# Patient Record
Sex: MALE | Race: WHITE | Employment: OTHER | URBAN - METROPOLITAN AREA
[De-identification: names, ages, dates, MRNs, and addresses within clinical notes are randomized per-mention and may not be internally consistent; named-entity substitution may affect disease eponyms.]

---

## 2017-09-21 ENCOUNTER — HOSPITAL ENCOUNTER (OUTPATIENT)
Dept: GENERAL RADIOLOGY | Age: 72
Discharge: HOME | End: 2017-09-21
Attending: FAMILY MEDICINE
Payer: MEDICARE

## 2017-09-21 DIAGNOSIS — M25.561 ACUTE PAIN OF RIGHT KNEE: ICD-10-CM

## 2017-09-21 PROCEDURE — 73562 X-RAY EXAM OF KNEE 3: CPT

## 2018-03-02 ENCOUNTER — HOSPITAL ENCOUNTER (OUTPATIENT)
Dept: GENERAL RADIOLOGY | Age: 73
Discharge: HOME | End: 2018-03-02
Attending: FAMILY MEDICINE
Payer: MEDICARE

## 2018-03-02 DIAGNOSIS — M54.2 NECK PAIN: ICD-10-CM

## 2018-03-02 PROCEDURE — 72052 X-RAY EXAM NECK SPINE 6/>VWS: CPT

## 2018-03-09 ENCOUNTER — HOSPITAL ENCOUNTER (OUTPATIENT)
Dept: MRI IMAGING | Age: 73
Discharge: HOME | End: 2018-03-09
Attending: FAMILY MEDICINE
Payer: MEDICARE

## 2018-03-09 DIAGNOSIS — M50.30 DDD (DEGENERATIVE DISC DISEASE), CERVICAL: ICD-10-CM

## 2018-03-09 DIAGNOSIS — M54.2 NECK PAIN: ICD-10-CM

## 2018-03-09 PROCEDURE — 72141 MRI NECK SPINE W/O DYE: CPT

## 2018-03-12 NOTE — PROGRESS NOTES
Not able to complete study  Able to get partial info- severe cervical spine dz    Ref Dr Lisseth Garland    call

## 2018-07-18 PROBLEM — M17.11 PRIMARY OSTEOARTHRITIS OF RIGHT KNEE: Status: ACTIVE | Noted: 2018-07-18

## 2018-07-18 PROBLEM — I10 DIABETES MELLITUS WITH COINCIDENT HYPERTENSION (HCC): Status: ACTIVE | Noted: 2018-07-18

## 2018-07-18 PROBLEM — E11.9 DIABETES MELLITUS WITH COINCIDENT HYPERTENSION (HCC): Status: ACTIVE | Noted: 2018-07-18

## 2018-07-23 ENCOUNTER — HOSPITAL ENCOUNTER (OUTPATIENT)
Dept: PREADMISSION TESTING | Age: 73
Discharge: HOME | End: 2018-07-23
Payer: MEDICARE

## 2018-07-23 VITALS
SYSTOLIC BLOOD PRESSURE: 145 MMHG | WEIGHT: 222.5 LBS | TEMPERATURE: 98.1 F | DIASTOLIC BLOOD PRESSURE: 86 MMHG | HEIGHT: 73 IN | OXYGEN SATURATION: 96 % | HEART RATE: 56 BPM | BODY MASS INDEX: 29.49 KG/M2

## 2018-07-23 LAB
ABO + RH BLD: NORMAL
APPEARANCE UR: CLEAR
BACTERIA URNS QL MICRO: NEGATIVE /HPF
BILIRUB UR QL: NEGATIVE
BLOOD GROUP ANTIBODIES SERPL: NORMAL
COLOR UR: ABNORMAL
EPITH CASTS URNS QL MICRO: ABNORMAL /LPF
EST. AVERAGE GLUCOSE BLD GHB EST-MCNC: 143 MG/DL
GLUCOSE UR STRIP.AUTO-MCNC: 250 MG/DL
HBA1C MFR BLD: 6.6 % (ref 4.2–6.3)
HGB UR QL STRIP: NEGATIVE
HYALINE CASTS URNS QL MICRO: ABNORMAL /LPF (ref 0–5)
KETONES UR QL STRIP.AUTO: NEGATIVE MG/DL
LEUKOCYTE ESTERASE UR QL STRIP.AUTO: NEGATIVE
NITRITE UR QL STRIP.AUTO: NEGATIVE
PH UR STRIP: 6 [PH] (ref 5–8)
PROT UR STRIP-MCNC: NEGATIVE MG/DL
RBC #/AREA URNS HPF: ABNORMAL /HPF (ref 0–5)
SP GR UR REFRACTOMETRY: 1.02 (ref 1–1.03)
SPECIMEN EXP DATE BLD: NORMAL
UA: UC IF INDICATED,UAUC: ABNORMAL
UROBILINOGEN UR QL STRIP.AUTO: 1 EU/DL (ref 0.2–1)
WBC URNS QL MICRO: ABNORMAL /HPF (ref 0–4)

## 2018-07-23 PROCEDURE — 83036 HEMOGLOBIN GLYCOSYLATED A1C: CPT | Performed by: ORTHOPAEDIC SURGERY

## 2018-07-23 PROCEDURE — 36415 COLL VENOUS BLD VENIPUNCTURE: CPT | Performed by: ORTHOPAEDIC SURGERY

## 2018-07-23 PROCEDURE — 81001 URINALYSIS AUTO W/SCOPE: CPT | Performed by: ORTHOPAEDIC SURGERY

## 2018-07-23 PROCEDURE — 86900 BLOOD TYPING SEROLOGIC ABO: CPT | Performed by: ORTHOPAEDIC SURGERY

## 2018-07-24 LAB
BACTERIA SPEC CULT: NORMAL
BACTERIA SPEC CULT: NORMAL
SERVICE CMNT-IMP: NORMAL

## 2018-07-25 RX ORDER — ACETAMINOPHEN 500 MG
1000 TABLET ORAL ONCE
Status: CANCELLED | OUTPATIENT
Start: 2018-07-25 | End: 2018-07-25

## 2018-07-25 RX ORDER — CELECOXIB 200 MG/1
200 CAPSULE ORAL ONCE
Status: CANCELLED | OUTPATIENT
Start: 2018-07-25 | End: 2018-07-25

## 2018-07-25 RX ORDER — CEFAZOLIN SODIUM/WATER 2 G/20 ML
2 SYRINGE (ML) INTRAVENOUS ONCE
Status: CANCELLED | OUTPATIENT
Start: 2018-07-25 | End: 2018-07-25

## 2018-07-25 RX ORDER — PREGABALIN 75 MG/1
75 CAPSULE ORAL ONCE
Status: CANCELLED | OUTPATIENT
Start: 2018-07-25 | End: 2018-07-25

## 2018-07-30 ENCOUNTER — ANESTHESIA EVENT (OUTPATIENT)
Dept: SURGERY | Age: 73
DRG: 470 | End: 2018-07-30
Payer: MEDICARE

## 2018-07-31 ENCOUNTER — HOSPITAL ENCOUNTER (INPATIENT)
Age: 73
LOS: 1 days | Discharge: HOME HEALTH | DRG: 470 | End: 2018-08-01
Attending: ORTHOPAEDIC SURGERY | Admitting: ORTHOPAEDIC SURGERY
Payer: MEDICARE

## 2018-07-31 ENCOUNTER — ANESTHESIA (OUTPATIENT)
Dept: SURGERY | Age: 73
DRG: 470 | End: 2018-07-31
Payer: MEDICARE

## 2018-07-31 DIAGNOSIS — M17.11 PRIMARY OSTEOARTHRITIS OF RIGHT KNEE: Primary | ICD-10-CM

## 2018-07-31 LAB
GLUCOSE BLD STRIP.AUTO-MCNC: 131 MG/DL (ref 65–100)
GLUCOSE BLD STRIP.AUTO-MCNC: 136 MG/DL (ref 65–100)
GLUCOSE BLD STRIP.AUTO-MCNC: 145 MG/DL (ref 65–100)
GLUCOSE BLD STRIP.AUTO-MCNC: 165 MG/DL (ref 65–100)
SERVICE CMNT-IMP: ABNORMAL

## 2018-07-31 PROCEDURE — 77030002991 HC SUT QUILL SSPC -B: Performed by: ORTHOPAEDIC SURGERY

## 2018-07-31 PROCEDURE — C1776 JOINT DEVICE (IMPLANTABLE): HCPCS | Performed by: ORTHOPAEDIC SURGERY

## 2018-07-31 PROCEDURE — 77030002933 HC SUT MCRYL J&J -A: Performed by: ORTHOPAEDIC SURGERY

## 2018-07-31 PROCEDURE — 74011250636 HC RX REV CODE- 250/636: Performed by: PHYSICIAN ASSISTANT

## 2018-07-31 PROCEDURE — 77030018822 HC SLV COMPR FT COVD -B

## 2018-07-31 PROCEDURE — 74011000250 HC RX REV CODE- 250: Performed by: ORTHOPAEDIC SURGERY

## 2018-07-31 PROCEDURE — 77030000032 HC CUF TRNQT ZIMM -B: Performed by: ORTHOPAEDIC SURGERY

## 2018-07-31 PROCEDURE — 64450 NJX AA&/STRD OTHER PN/BRANCH: CPT

## 2018-07-31 PROCEDURE — 77030006822 HC BLD SAW SAG BRSM -B: Performed by: ORTHOPAEDIC SURGERY

## 2018-07-31 PROCEDURE — 74011250636 HC RX REV CODE- 250/636

## 2018-07-31 PROCEDURE — 77030006812 HC BLD SAW RECIP STRY -B: Performed by: ORTHOPAEDIC SURGERY

## 2018-07-31 PROCEDURE — 74011250637 HC RX REV CODE- 250/637: Performed by: PHYSICIAN ASSISTANT

## 2018-07-31 PROCEDURE — 76010000172 HC OR TIME 2.5 TO 3 HR INTENSV-TIER 1: Performed by: ORTHOPAEDIC SURGERY

## 2018-07-31 PROCEDURE — 74011636637 HC RX REV CODE- 636/637: Performed by: ORTHOPAEDIC SURGERY

## 2018-07-31 PROCEDURE — 77030031139 HC SUT VCRL2 J&J -A: Performed by: ORTHOPAEDIC SURGERY

## 2018-07-31 PROCEDURE — 77030020782 HC GWN BAIR PAWS FLX 3M -B

## 2018-07-31 PROCEDURE — 77030003601 HC NDL NRV BLK BBMI -A

## 2018-07-31 PROCEDURE — 77030011640 HC PAD GRND REM COVD -A: Performed by: ORTHOPAEDIC SURGERY

## 2018-07-31 PROCEDURE — 74011250636 HC RX REV CODE- 250/636: Performed by: ANESTHESIOLOGY

## 2018-07-31 PROCEDURE — 97116 GAIT TRAINING THERAPY: CPT | Performed by: PHYSICAL THERAPIST

## 2018-07-31 PROCEDURE — 82962 GLUCOSE BLOOD TEST: CPT

## 2018-07-31 PROCEDURE — 77030018836 HC SOL IRR NACL ICUM -A: Performed by: ORTHOPAEDIC SURGERY

## 2018-07-31 PROCEDURE — 77030012935 HC DRSG AQUACEL BMS -B: Performed by: ORTHOPAEDIC SURGERY

## 2018-07-31 PROCEDURE — C1713 ANCHOR/SCREW BN/BN,TIS/BN: HCPCS | Performed by: ORTHOPAEDIC SURGERY

## 2018-07-31 PROCEDURE — 77030028224 HC PDNG CST BSNM -A: Performed by: ORTHOPAEDIC SURGERY

## 2018-07-31 PROCEDURE — 76210000006 HC OR PH I REC 0.5 TO 1 HR: Performed by: ORTHOPAEDIC SURGERY

## 2018-07-31 PROCEDURE — 77030007866 HC KT SPN ANES BBMI -B: Performed by: ANESTHESIOLOGY

## 2018-07-31 PROCEDURE — 97161 PT EVAL LOW COMPLEX 20 MIN: CPT | Performed by: PHYSICAL THERAPIST

## 2018-07-31 PROCEDURE — 77030038020 HC MANFLD NEPTUNE STRY -B: Performed by: ORTHOPAEDIC SURGERY

## 2018-07-31 PROCEDURE — 65270000029 HC RM PRIVATE

## 2018-07-31 PROCEDURE — 74011000258 HC RX REV CODE- 258

## 2018-07-31 PROCEDURE — 77030019908 HC STETH ESOPH SIMS -A: Performed by: ANESTHESIOLOGY

## 2018-07-31 PROCEDURE — 76060000036 HC ANESTHESIA 2.5 TO 3 HR: Performed by: ORTHOPAEDIC SURGERY

## 2018-07-31 PROCEDURE — 77030014077 HC TOWER MX CEM J&J -C: Performed by: ORTHOPAEDIC SURGERY

## 2018-07-31 PROCEDURE — 77030039266 HC ADH SKN EXOFIN S2SG -A: Performed by: ORTHOPAEDIC SURGERY

## 2018-07-31 PROCEDURE — 77030034850: Performed by: ORTHOPAEDIC SURGERY

## 2018-07-31 PROCEDURE — 74011000250 HC RX REV CODE- 250

## 2018-07-31 PROCEDURE — 74011250637 HC RX REV CODE- 250/637: Performed by: ORTHOPAEDIC SURGERY

## 2018-07-31 PROCEDURE — 77030020788: Performed by: ORTHOPAEDIC SURGERY

## 2018-07-31 PROCEDURE — 0SRC0L9 REPLACEMENT OF RIGHT KNEE JOINT WITH MEDIAL UNICONDYLAR SYNTHETIC SUBSTITUTE, CEMENTED, OPEN APPROACH: ICD-10-PCS | Performed by: ORTHOPAEDIC SURGERY

## 2018-07-31 PROCEDURE — 74011250636 HC RX REV CODE- 250/636: Performed by: ORTHOPAEDIC SURGERY

## 2018-07-31 PROCEDURE — 3E0T3BZ INTRODUCTION OF ANESTHETIC AGENT INTO PERIPHERAL NERVES AND PLEXI, PERCUTANEOUS APPROACH: ICD-10-PCS | Performed by: ANESTHESIOLOGY

## 2018-07-31 DEVICE — CEMENT BNE GENTAMICIN 40 GM SMARTSET GMV: Type: IMPLANTABLE DEVICE | Site: KNEE | Status: FUNCTIONAL

## 2018-07-31 DEVICE — SIGMA HIGH PERFORMANCE PARTIAL KNEE FEMORAL UNICONDYLAR CEMENTED SIZE 5 LM/RL
Type: IMPLANTABLE DEVICE | Site: KNEE | Status: FUNCTIONAL
Brand: SIGMA

## 2018-07-31 DEVICE — SIGMA HIGH PERFORMANCE PARTIAL KNEE TIBIAL TRAY UNICONDYLAR METAL BACKED SIZE 4 LM/RL
Type: IMPLANTABLE DEVICE | Site: KNEE | Status: FUNCTIONAL
Brand: SIGMA

## 2018-07-31 DEVICE — SIGMA HIGH PERFORMANCE PARTIAL KNEE TIBIAL INSERT FIXED BEARING UNICONDYLAR SIZE 4 LM/RL 7MM XLK
Type: IMPLANTABLE DEVICE | Site: KNEE | Status: FUNCTIONAL
Brand: SIGMA

## 2018-07-31 DEVICE — COMPONENT TOT KNEE UPLR FEM PART: Type: IMPLANTABLE DEVICE | Site: KNEE | Status: FUNCTIONAL

## 2018-07-31 RX ORDER — FENTANYL CITRATE 50 UG/ML
25 INJECTION, SOLUTION INTRAMUSCULAR; INTRAVENOUS
Status: DISCONTINUED | OUTPATIENT
Start: 2018-07-31 | End: 2018-07-31 | Stop reason: HOSPADM

## 2018-07-31 RX ORDER — POLYETHYLENE GLYCOL 3350 17 G/17G
17 POWDER, FOR SOLUTION ORAL DAILY
Status: DISCONTINUED | OUTPATIENT
Start: 2018-08-01 | End: 2018-08-01 | Stop reason: HOSPADM

## 2018-07-31 RX ORDER — HYDROXYZINE HYDROCHLORIDE 10 MG/1
10 TABLET, FILM COATED ORAL
Status: DISCONTINUED | OUTPATIENT
Start: 2018-07-31 | End: 2018-08-01 | Stop reason: HOSPADM

## 2018-07-31 RX ORDER — KETOROLAC TROMETHAMINE 30 MG/ML
15 INJECTION, SOLUTION INTRAMUSCULAR; INTRAVENOUS EVERY 6 HOURS
Status: COMPLETED | OUTPATIENT
Start: 2018-07-31 | End: 2018-08-01

## 2018-07-31 RX ORDER — FACIAL-BODY WIPES
10 EACH TOPICAL DAILY PRN
Status: DISCONTINUED | OUTPATIENT
Start: 2018-08-01 | End: 2018-08-01 | Stop reason: HOSPADM

## 2018-07-31 RX ORDER — CEFAZOLIN SODIUM/WATER 2 G/20 ML
2 SYRINGE (ML) INTRAVENOUS EVERY 8 HOURS
Status: COMPLETED | OUTPATIENT
Start: 2018-07-31 | End: 2018-07-31

## 2018-07-31 RX ORDER — MAGNESIUM SULFATE 100 %
4 CRYSTALS MISCELLANEOUS AS NEEDED
Status: DISCONTINUED | OUTPATIENT
Start: 2018-07-31 | End: 2018-08-01 | Stop reason: HOSPADM

## 2018-07-31 RX ORDER — SODIUM CHLORIDE, SODIUM LACTATE, POTASSIUM CHLORIDE, CALCIUM CHLORIDE 600; 310; 30; 20 MG/100ML; MG/100ML; MG/100ML; MG/100ML
75 INJECTION, SOLUTION INTRAVENOUS CONTINUOUS
Status: DISCONTINUED | OUTPATIENT
Start: 2018-07-31 | End: 2018-07-31 | Stop reason: HOSPADM

## 2018-07-31 RX ORDER — HYDROMORPHONE HYDROCHLORIDE 1 MG/ML
0.5 INJECTION, SOLUTION INTRAMUSCULAR; INTRAVENOUS; SUBCUTANEOUS
Status: DISCONTINUED | OUTPATIENT
Start: 2018-07-31 | End: 2018-08-01 | Stop reason: HOSPADM

## 2018-07-31 RX ORDER — ASPIRIN 81 MG/1
81 TABLET ORAL 2 TIMES DAILY
Status: DISCONTINUED | OUTPATIENT
Start: 2018-07-31 | End: 2018-08-01 | Stop reason: HOSPADM

## 2018-07-31 RX ORDER — SODIUM CHLORIDE 0.9 % (FLUSH) 0.9 %
5-10 SYRINGE (ML) INJECTION EVERY 8 HOURS
Status: DISCONTINUED | OUTPATIENT
Start: 2018-07-31 | End: 2018-07-31 | Stop reason: HOSPADM

## 2018-07-31 RX ORDER — FENTANYL CITRATE 50 UG/ML
50 INJECTION, SOLUTION INTRAMUSCULAR; INTRAVENOUS AS NEEDED
Status: DISCONTINUED | OUTPATIENT
Start: 2018-07-31 | End: 2018-07-31 | Stop reason: HOSPADM

## 2018-07-31 RX ORDER — FINASTERIDE 5 MG/1
5 TABLET, FILM COATED ORAL DAILY
Status: DISCONTINUED | OUTPATIENT
Start: 2018-08-01 | End: 2018-08-01 | Stop reason: HOSPADM

## 2018-07-31 RX ORDER — MIDAZOLAM HYDROCHLORIDE 1 MG/ML
1 INJECTION, SOLUTION INTRAMUSCULAR; INTRAVENOUS AS NEEDED
Status: DISCONTINUED | OUTPATIENT
Start: 2018-07-31 | End: 2018-07-31 | Stop reason: HOSPADM

## 2018-07-31 RX ORDER — AMOXICILLIN 250 MG
1 CAPSULE ORAL 2 TIMES DAILY
Status: DISCONTINUED | OUTPATIENT
Start: 2018-07-31 | End: 2018-08-01 | Stop reason: HOSPADM

## 2018-07-31 RX ORDER — ONDANSETRON 2 MG/ML
4 INJECTION INTRAMUSCULAR; INTRAVENOUS AS NEEDED
Status: DISCONTINUED | OUTPATIENT
Start: 2018-07-31 | End: 2018-07-31 | Stop reason: HOSPADM

## 2018-07-31 RX ORDER — ROPIVACAINE HYDROCHLORIDE 5 MG/ML
30 INJECTION, SOLUTION EPIDURAL; INFILTRATION; PERINEURAL ONCE
Status: DISCONTINUED | OUTPATIENT
Start: 2018-07-31 | End: 2018-07-31 | Stop reason: HOSPADM

## 2018-07-31 RX ORDER — MORPHINE SULFATE 10 MG/ML
2 INJECTION, SOLUTION INTRAMUSCULAR; INTRAVENOUS
Status: DISCONTINUED | OUTPATIENT
Start: 2018-07-31 | End: 2018-07-31 | Stop reason: HOSPADM

## 2018-07-31 RX ORDER — ACETAMINOPHEN 500 MG
1000 TABLET ORAL ONCE
Status: COMPLETED | OUTPATIENT
Start: 2018-07-31 | End: 2018-07-31

## 2018-07-31 RX ORDER — ACETAMINOPHEN 500 MG
500 TABLET ORAL
Status: DISCONTINUED | OUTPATIENT
Start: 2018-07-31 | End: 2018-08-01 | Stop reason: HOSPADM

## 2018-07-31 RX ORDER — SODIUM CHLORIDE 9 MG/ML
25 INJECTION, SOLUTION INTRAVENOUS CONTINUOUS
Status: DISCONTINUED | OUTPATIENT
Start: 2018-07-31 | End: 2018-07-31 | Stop reason: HOSPADM

## 2018-07-31 RX ORDER — OXYCODONE HYDROCHLORIDE 5 MG/1
10 TABLET ORAL
Status: DISCONTINUED | OUTPATIENT
Start: 2018-07-31 | End: 2018-08-01 | Stop reason: HOSPADM

## 2018-07-31 RX ORDER — METFORMIN HYDROCHLORIDE 500 MG/1
1000 TABLET ORAL 2 TIMES DAILY WITH MEALS
Status: DISCONTINUED | OUTPATIENT
Start: 2018-08-01 | End: 2018-08-01 | Stop reason: HOSPADM

## 2018-07-31 RX ORDER — PROPOFOL 10 MG/ML
INJECTION, EMULSION INTRAVENOUS AS NEEDED
Status: DISCONTINUED | OUTPATIENT
Start: 2018-07-31 | End: 2018-07-31 | Stop reason: HOSPADM

## 2018-07-31 RX ORDER — PROPOFOL 10 MG/ML
INJECTION, EMULSION INTRAVENOUS
Status: DISCONTINUED | OUTPATIENT
Start: 2018-07-31 | End: 2018-07-31 | Stop reason: HOSPADM

## 2018-07-31 RX ORDER — SODIUM CHLORIDE 9 MG/ML
125 INJECTION, SOLUTION INTRAVENOUS CONTINUOUS
Status: DISPENSED | OUTPATIENT
Start: 2018-07-31 | End: 2018-08-01

## 2018-07-31 RX ORDER — SODIUM CHLORIDE 0.9 % (FLUSH) 0.9 %
5-10 SYRINGE (ML) INJECTION AS NEEDED
Status: DISCONTINUED | OUTPATIENT
Start: 2018-07-31 | End: 2018-08-01 | Stop reason: HOSPADM

## 2018-07-31 RX ORDER — BUPIVACAINE HYDROCHLORIDE 5 MG/ML
INJECTION, SOLUTION EPIDURAL; INTRACAUDAL AS NEEDED
Status: DISCONTINUED | OUTPATIENT
Start: 2018-07-31 | End: 2018-07-31 | Stop reason: HOSPADM

## 2018-07-31 RX ORDER — SODIUM CHLORIDE 0.9 % (FLUSH) 0.9 %
5-10 SYRINGE (ML) INJECTION AS NEEDED
Status: DISCONTINUED | OUTPATIENT
Start: 2018-07-31 | End: 2018-07-31 | Stop reason: HOSPADM

## 2018-07-31 RX ORDER — PREGABALIN 75 MG/1
75 CAPSULE ORAL ONCE
Status: COMPLETED | OUTPATIENT
Start: 2018-07-31 | End: 2018-07-31

## 2018-07-31 RX ORDER — NALOXONE HYDROCHLORIDE 0.4 MG/ML
0.4 INJECTION, SOLUTION INTRAMUSCULAR; INTRAVENOUS; SUBCUTANEOUS AS NEEDED
Status: DISCONTINUED | OUTPATIENT
Start: 2018-07-31 | End: 2018-08-01 | Stop reason: HOSPADM

## 2018-07-31 RX ORDER — CELECOXIB 200 MG/1
200 CAPSULE ORAL ONCE
Status: COMPLETED | OUTPATIENT
Start: 2018-07-31 | End: 2018-07-31

## 2018-07-31 RX ORDER — ONDANSETRON 2 MG/ML
4 INJECTION INTRAMUSCULAR; INTRAVENOUS
Status: ACTIVE | OUTPATIENT
Start: 2018-07-31 | End: 2018-08-01

## 2018-07-31 RX ORDER — SODIUM CHLORIDE, SODIUM LACTATE, POTASSIUM CHLORIDE, CALCIUM CHLORIDE 600; 310; 30; 20 MG/100ML; MG/100ML; MG/100ML; MG/100ML
125 INJECTION, SOLUTION INTRAVENOUS CONTINUOUS
Status: DISCONTINUED | OUTPATIENT
Start: 2018-07-31 | End: 2018-07-31 | Stop reason: HOSPADM

## 2018-07-31 RX ORDER — CEFAZOLIN SODIUM/WATER 2 G/20 ML
2 SYRINGE (ML) INTRAVENOUS ONCE
Status: COMPLETED | OUTPATIENT
Start: 2018-07-31 | End: 2018-07-31

## 2018-07-31 RX ORDER — LIDOCAINE HYDROCHLORIDE 10 MG/ML
0.1 INJECTION, SOLUTION EPIDURAL; INFILTRATION; INTRACAUDAL; PERINEURAL AS NEEDED
Status: DISCONTINUED | OUTPATIENT
Start: 2018-07-31 | End: 2018-07-31 | Stop reason: HOSPADM

## 2018-07-31 RX ORDER — OXYCODONE HYDROCHLORIDE 5 MG/1
5 TABLET ORAL
Status: DISCONTINUED | OUTPATIENT
Start: 2018-07-31 | End: 2018-08-01 | Stop reason: HOSPADM

## 2018-07-31 RX ORDER — SODIUM CHLORIDE 0.9 % (FLUSH) 0.9 %
5-10 SYRINGE (ML) INJECTION EVERY 8 HOURS
Status: DISCONTINUED | OUTPATIENT
Start: 2018-07-31 | End: 2018-08-01 | Stop reason: HOSPADM

## 2018-07-31 RX ORDER — DIPHENHYDRAMINE HYDROCHLORIDE 50 MG/ML
12.5 INJECTION, SOLUTION INTRAMUSCULAR; INTRAVENOUS AS NEEDED
Status: DISCONTINUED | OUTPATIENT
Start: 2018-07-31 | End: 2018-07-31 | Stop reason: HOSPADM

## 2018-07-31 RX ORDER — GLIMEPIRIDE 2 MG/1
2 TABLET ORAL
Status: DISCONTINUED | OUTPATIENT
Start: 2018-08-01 | End: 2018-08-01 | Stop reason: HOSPADM

## 2018-07-31 RX ORDER — INSULIN LISPRO 100 [IU]/ML
INJECTION, SOLUTION INTRAVENOUS; SUBCUTANEOUS
Status: DISCONTINUED | OUTPATIENT
Start: 2018-07-31 | End: 2018-08-01 | Stop reason: HOSPADM

## 2018-07-31 RX ORDER — MIDAZOLAM HYDROCHLORIDE 1 MG/ML
0.5 INJECTION, SOLUTION INTRAMUSCULAR; INTRAVENOUS
Status: DISCONTINUED | OUTPATIENT
Start: 2018-07-31 | End: 2018-07-31 | Stop reason: HOSPADM

## 2018-07-31 RX ORDER — DEXTROSE 50 % IN WATER (D50W) INTRAVENOUS SYRINGE
12.5-25 AS NEEDED
Status: DISCONTINUED | OUTPATIENT
Start: 2018-07-31 | End: 2018-08-01 | Stop reason: HOSPADM

## 2018-07-31 RX ADMIN — PROPOFOL 20 MG: 10 INJECTION, EMULSION INTRAVENOUS at 07:21

## 2018-07-31 RX ADMIN — KETOROLAC TROMETHAMINE 15 MG: 30 INJECTION, SOLUTION INTRAMUSCULAR; INTRAVENOUS at 16:33

## 2018-07-31 RX ADMIN — INSULIN LISPRO 2 UNITS: 100 INJECTION, SOLUTION INTRAVENOUS; SUBCUTANEOUS at 16:32

## 2018-07-31 RX ADMIN — Medication 10 ML: at 14:00

## 2018-07-31 RX ADMIN — PREGABALIN 75 MG: 75 CAPSULE ORAL at 06:42

## 2018-07-31 RX ADMIN — Medication 2 G: at 07:35

## 2018-07-31 RX ADMIN — MIDAZOLAM HYDROCHLORIDE 2 MG: 1 INJECTION, SOLUTION INTRAMUSCULAR; INTRAVENOUS at 06:57

## 2018-07-31 RX ADMIN — PROPOFOL 20 MG: 10 INJECTION, EMULSION INTRAVENOUS at 07:22

## 2018-07-31 RX ADMIN — BUPIVACAINE HYDROCHLORIDE 2.4 ML: 5 INJECTION, SOLUTION EPIDURAL; INTRACAUDAL at 07:31

## 2018-07-31 RX ADMIN — STANDARDIZED SENNA CONCENTRATE AND DOCUSATE SODIUM 1 TABLET: 8.6; 5 TABLET, FILM COATED ORAL at 17:57

## 2018-07-31 RX ADMIN — PROPOFOL 20 MG: 10 INJECTION, EMULSION INTRAVENOUS at 08:53

## 2018-07-31 RX ADMIN — SODIUM CHLORIDE, SODIUM LACTATE, POTASSIUM CHLORIDE, AND CALCIUM CHLORIDE: 600; 310; 30; 20 INJECTION, SOLUTION INTRAVENOUS at 09:33

## 2018-07-31 RX ADMIN — SODIUM CHLORIDE, SODIUM LACTATE, POTASSIUM CHLORIDE, AND CALCIUM CHLORIDE 125 ML/HR: 600; 310; 30; 20 INJECTION, SOLUTION INTRAVENOUS at 06:37

## 2018-07-31 RX ADMIN — ACETAMINOPHEN 500 MG: 500 TABLET, FILM COATED ORAL at 22:22

## 2018-07-31 RX ADMIN — Medication 10 ML: at 22:00

## 2018-07-31 RX ADMIN — SODIUM CHLORIDE 125 ML/HR: 900 INJECTION, SOLUTION INTRAVENOUS at 13:00

## 2018-07-31 RX ADMIN — SODIUM CHLORIDE 125 ML/HR: 900 INJECTION, SOLUTION INTRAVENOUS at 20:47

## 2018-07-31 RX ADMIN — PROPOFOL 10 MG: 10 INJECTION, EMULSION INTRAVENOUS at 07:20

## 2018-07-31 RX ADMIN — ACETAMINOPHEN 500 MG: 500 TABLET, FILM COATED ORAL at 17:56

## 2018-07-31 RX ADMIN — KETOROLAC TROMETHAMINE 15 MG: 30 INJECTION, SOLUTION INTRAMUSCULAR; INTRAVENOUS at 22:22

## 2018-07-31 RX ADMIN — ACETAMINOPHEN 500 MG: 500 TABLET, FILM COATED ORAL at 14:49

## 2018-07-31 RX ADMIN — PROPOFOL 40 MCG/KG/MIN: 10 INJECTION, EMULSION INTRAVENOUS at 07:20

## 2018-07-31 RX ADMIN — Medication 2 G: at 15:31

## 2018-07-31 RX ADMIN — ASPIRIN 81 MG: 81 TABLET, DELAYED RELEASE ORAL at 17:57

## 2018-07-31 RX ADMIN — CELECOXIB 200 MG: 200 CAPSULE ORAL at 06:42

## 2018-07-31 RX ADMIN — Medication 2 G: at 23:23

## 2018-07-31 RX ADMIN — ACETAMINOPHEN 1000 MG: 500 TABLET, FILM COATED ORAL at 06:41

## 2018-07-31 RX ADMIN — FENTANYL CITRATE 50 MCG: 50 INJECTION, SOLUTION INTRAMUSCULAR; INTRAVENOUS at 06:57

## 2018-07-31 NOTE — PROGRESS NOTES
Spoke with Jessica Day in Dr. Lord Clifton office. Telephone with readback per Dr. Edwar Goode, placed order for insulin sliding scale, ac&hs.

## 2018-07-31 NOTE — PROGRESS NOTES
TRANSFER - IN REPORT:    Verbal report received from [facility] ANN WOODY RN(name) on Yuki Natarajan  being received from [employer]) for routine post - op      Report consisted of patients Situation, Background, Assessment and   Recommendations(SBAR). Information from the following report(s) SBAR, Kardex, OR Summary, Procedure Summary, Intake/Output, MAR and Recent Results was reviewed with the receiving nurse. Opportunity for questions and clarification was provided. Assessment completed upon patients arrival to unit and care assumed.

## 2018-07-31 NOTE — IP AVS SNAPSHOT
[address] [facility] [address] [place] [address] 
[phone] Patient: Lisette Noguera MRN: [id number] About your hospitalization You were admitted on:  July 31, 2018 [facility] on:  August 1, 2018 Why you were hospitalized Your primary diagnosis was:  Not on File Your diagnoses also included:  Primary Osteoarthritis Of One Knee, Right Follow-up Information Follow up With Details Comments Contact Info Vijaya Maravilla MD   [address] [address] 
[phone] AT [facility] skilled nursing and physical therapy. [address] [place] [address] 
[phone] Discharge Orders None A check zari indicates which time of day the medication should be taken. My Medications START taking these medications Instructions Each Dose to Equal  
 Morning Noon Evening Bedtime  
 acetaminophen 500 mg tablet Commonly known as:  TYLENOL Your last dose was: Your next dose is: Take 1 Tab by mouth every four (4) hours (while awake). 500 mg  
    
   
   
   
  
 aspirin delayed-release 81 mg tablet Your last dose was: Your next dose is: Take 1 Tab by mouth two (2) times a day. Indications: for blood clot prevention 81 mg  
    
   
   
   
  
 oxyCODONE IR 5 mg immediate release tablet Commonly known as:  Diamond Daniel Your last dose was: Your next dose is:    
   
   
 1-2 tablets by mouth every 4-6 hours as needed for pain  
     
   
   
   
  
 senna-docusate 8.6-50 mg per tablet Commonly known as:  Ulyslei Caras Your last dose was: Your next dose is: Take 1 Tab by mouth two (2) times a day. 1 Tab CONTINUE taking these medications  Instructions Each Dose to Equal  
 Morning Noon Evening Bedtime  
 finasteride 5 mg tablet Commonly known as:  PROSCAR Your last dose was: Your next dose is: Take 5 mg by mouth daily. 5 mg  
    
   
   
   
  
 glimepiride 2 mg tablet Commonly known as:  AMARYL Your last dose was: Your next dose is: Take 1 Tab by mouth every morning. 2 mg  
    
   
   
   
  
 lisinopril-hydroCHLOROthiazide 20-25 mg per tablet Commonly known as:  Vicente Menjivar Your last dose was: Your next dose is: TAKE 1 TABLET BY MOUTH EVERY DAY  
     
   
   
   
  
 metFORMIN 1,000 mg tablet Commonly known as:  GLUCOPHAGE Your last dose was: Your next dose is: TAKE 1 TAB BY MOUTH TWO (2) TIMES DAILY (WITH MEALS). Where to Get Your Medications Information on where to get these meds will be given to you by the nurse or doctor. ! Ask your nurse or doctor about these medications  
  acetaminophen 500 mg tablet  
 aspirin delayed-release 81 mg tablet  
 oxyCODONE IR 5 mg immediate release tablet  
 senna-docusate 8.6-50 mg per tablet Opioid Education Prescription Opioids: What You Need to Know: 
 
Prescription opioids can be used to help relieve moderate-to-severe pain and are often prescribed following a surgery or injury, or for certain health conditions. These medications can be an important part of treatment but also come with serious risks. Opioids are strong pain medicines. Examples include hydrocodone, oxycodone, fentanyl, and morphine. Heroin is an example of an illegal opioid. It is important to work with your health care provider to make sure you are getting the safest, most effective care. WHAT ARE THE RISKS AND SIDE EFFECTS OF OPIOID USE? Prescription opioids carry serious risks of addiction and overdose, especially with prolonged use.   An opioid overdose, often marked by slow breathing, can cause sudden death. The use of prescription opioids can have a number of side effects as well, even when taken as directed. · Tolerance-meaning you might need to take more of a medication for the same pain relief · Physical dependence-meaning you have symptoms of withdrawal when the medication is stopped. Withdrawal symptoms can include nausea, sweating, chills, diarrhea, stomach cramps, and muscle aches. Withdrawal can last up to several weeks, depending on which drug you took and how long you took it. · Increased sensitivity to pain · Constipation · Nausea, vomiting, and dry mouth · Sleepiness and dizziness · Confusion · Depression · Low levels of testosterone that can result in lower sex drive, energy, and strength · Itching and sweating RISKS ARE GREATER WITH:      
· History of drug misuse, substance use disorder, or overdose · Mental health conditions (such as depression or anxiety) · Sleep apnea · Older age (72 years or older) · Pregnancy Avoid alcohol while taking prescription opioids. Also, unless specifically advised by your health care provider, medications to avoid include: · Benzodiazepines (such as Xanax or Valium) · Muscle relaxants (such as Soma or Flexeril) · Hypnotics (such as Ambien or Lunesta) · Other prescription opioids KNOW YOUR OPTIONS Talk to your health care provider about ways to manage your pain that don't involve prescription opioids. Some of these options may actually work better and have fewer risks and side effects. Options may include: 
· Pain relievers such as acetaminophen, ibuprofen, and naproxen · Some medications that are also used for depression or seizures · Physical therapy and exercise · Counseling to help patients learn how to cope better with triggers of pain and stress. · Application of heat or cold compress · Massage therapy · Relaxation techniques Be Informed Make sure you know the name of your medication, how much and how often to take it, and its potential risks & side effects. IF YOU ARE PRESCRIBED OPIOIDS FOR PAIN: 
· Never take opioids in greater amounts or more often than prescribed. Remember the goal is not to be pain-free but to manage your pain at a tolerable level. · Follow up with your primary care provider to: · Work together to create a plan on how to manage your pain. · Talk about ways to help manage your pain that don't involve prescription opioids. · Talk about any and all concerns and side effects. · Help prevent misuse and abuse. · Never sell or share prescription opioids · Help prevent misuse and abuse. · Store prescription opioids in a secure place and out of reach of others (this may include visitors, children, friends, and family). · Safely dispose of unused/unwanted prescription opioids: Find your community drug take-back program or your pharmacy mail-back program, or flush them down the toilet, following guidance from the Food and Drug Administration (www.fda.gov/Drugs/ResourcesForYou). · Visit www.cdc.gov/drugoverdose to learn about the risks of opioid abuse and overdose. · If you believe you may be struggling with addiction, tell your health care provider and ask for guidance or call [address] [place] [address] at 2-749-596-NKCU. Discharge Instructions Patient meets criteria for BUNDLED PAYMENT  
for Care Improvement Initiative Criteria Contact Information for Orthopedic Nurse Navigator:     
PAMELA Ramirez, RN-BC 
P:[phone] L:[phone] After Hospital Care Plan:  Discharge Instructions Uni Knee Replacement- Dr. Candice Porter Patient Name: Jennifer Rios Date of procedure: 2018 Procedure: Procedure(s): RIGHT LATERAL UNICONDYLAR VS RIGHT TOTAL KNEE ARTHROPLASTY ( SPINAL IV SED ) Surgeon: Surgeon(s) and Role: Zeyad Echols MD - Primary PCP: Brianna Lewis MD 
Date of discharge: No discharge date for patient encounter. Follow up appointments ? Follow up with Dr. Hillary Abel in 4 weeks. Call [phone] to make an appointment. ? If home health has been arranged for you the agency will contact you to arrange dates/times for visits. Please call them if you do not hear from them within 24 hours after you are discharged When to call your Orthopaedic Surgeon: Call [phone]. If you call after 5pm or on a weekend, the on call physician will be contacted ? Unrelieved pain ? Signs of infection-if your incision is red; continues to have drainage; drainage has a foul odor or if you have a persistent fever over 101 degrees ? Signs of a blood clot in your leg-calf pain, tenderness, redness, swelling of lower leg When to call your Primary Care Physician: 
? Concerns about medical conditions such as diabetes, high blood pressure, asthma, congestive heart failure ? Call if blood sugars are elevated, persistent headache or dizziness, coughing or congestion, constipation or diarrhea, burning with urination, abnormal heart rate When to call [id number] go to the nearest emergency room ? Acute onset of chest pain, shortness of breath, difficulty breathing Activity ? Weight bearing as tolerated with walker or crutches. Refer to pages 23-31 of your handbook for instructions and pictures ? Complete your Home Exercise Program daily as instructed by your therapist.  Refer to pages 33-41 of your handbook for instructions and pictures ? Get up every one hour and walk (except at night when sleeping) ? Do not drive or operate heavy machinery Incision Care ? The Aquacel (brown, waterproof) surgical dressing is to remain on your knee for 7 days.  On the 7th day have someone gently peel the dressing off by carefully lifting the edge and stretching it slightly to break the adhesive seal 
 ? If you have steri-strips (small, white pieces of tape) on your incision, they may come off when you remove the Aquacel surgical dressing. This is okay. You may now leave your incision open to air ? If your Aquacel dressing comes loose/off before the 7th day, you may replace it with a dry sterile gauze dressing; change it daily. Once you incision is not draining, you may leave it open to air ? You may take a shower with the Aquacel dressing in place. Once the Aquacel is removed, you may shower and get your incision wet but do not submerge your incision under water in a bath tub, hot tub or swimming pool for 6 weeks after surgery. Preventing blood clots ? Take Enteric coated Aspirin (delayed release) 81mg, one tablet twice a day for one month following surgery Pain management ? Take pain medication as prescribed; decrease the amount you use as your pain lessens ? Avoid alcoholic beverages while taking pain medication 
o Please be aware that many medications contain Tylenol. We do not want you to over medicate so please read the information below as a guide. Do not take more than 3 grams of Tylenol per day. ? You may place an ice bag on your knee for 15-20 minutes after exercising Diet ? Resume usual diet; drink plenty of fluids; eat foods high in fiber ? You may want to take a stool softener (such as Senokot-S or Colace) to prevent constipation while you are taking pain medication. If constipation occurs, take a laxative (such as Dulcolax tablets, Milk of Magnesia, or a suppository) Home Health Care Protocol (to be followed by [facility]) Nursing-per physicians order ? Complete head to toe assessment, vital signs ? Medication reconciliation ? Review pain management ? Manage chronic medical conditions Physical Therapy-per physicians order Weight bearing status: 
Precautions at Admission: Fall, WBAT Mobility Status: 
Supine to Sit: Supervision Sit to Stand: Contact guard assistance Sit to Supine: Supervision Gait: 
Distance (ft): 75 Feet (ft) Ambulation - Level of Assistance: Contact guard assistance Assistive Device: Gait belt, Walker, rolling Gait Abnormalities: Antalgic, Decreased step clearance ADL status overall composite: 
  
  
  
  
 
Physical Therapy ? Assessment and evaluation-bed mobility; functional transfers (bed, chair, bathroom, stairs); ambulation with equipment, car transfers, shower transfers, safety and ability to get out of house in the event of an emergency ? Review weight bearing as tolerated, wean from walker or crutches as tolerated ? Discuss pain management ? Review how to do ADLs. Refer to page 42 of patient handbook Home Exercise Program-refer to pages 33-41 of patient handbook for exercises. [facility] Announcement We are excited to announce that we are making your provider's discharge notes available to you in [facility]. You will see these notes when they are completed and signed by the physician that discharged you from your recent hospital stay. If you have any questions or concerns about any information you see in [facility], please call the Health Information Department where you were seen or reach out to your Primary Care Provider for more information about your plan of care. Introducing [facility]! Jung Noguera introduces [facility] patient portal. Now you can access parts of your medical record, email your doctor's office, and request medication refills online. 1. In your internet browser, go to [email or web address] 2. Click on the First Time User? Click Here link in the Sign In box. You will see the New Member Sign Up page. 3. Enter your [facility] Access Code exactly as it appears below. You will not need to use this code after youve completed the sign-up process. If you do not sign up before the expiration date, you must request a new code. · [employer] Access Code: [email or web address] Expires: 10/16/2018  1:30 PM 
 
4. Enter the last four digits of your Social Security Number (xxxx) and Date of Birth (mm/dd/yyyy) as indicated and click Submit. You will be taken to the next sign-up page. 5. Create a [employer] ID. This will be your [employer] login ID and cannot be changed, so think of one that is secure and easy to remember. 6. Create a [employer] password. You can change your password at any time. 7. Enter your Password Reset Question and Answer. This can be used at a later time if you forget your password. 8. Enter your e-mail address. You will receive e-mail notification when new information is available in [address] 9. Click Sign Up. You can now view and download portions of your medical record. 10. Click the Download Summary menu link to download a portable copy of your medical information. If you have questions, please visit the Frequently Asked Questions section of the [employer] website. Remember, [employer] is NOT to be used for urgent needs. For medical emergencies, dial 911. Now available from your iPhone and Android! Introducing Garth Amaya As a [facility] patient, I wanted to make you aware of our electronic visit tool called Garth Joseflashondabree. [facility] 24/7 allows you to connect within minutes with a medical provider 24 hours a day, seven days a week via a mobile device or tablet or logging into a secure website from your computer. You can access Garth Amaya from anywhere in the [place]. A virtual visit might be right for you when you have a simple condition and feel like you just dont want to get out of bed, or cant get away from work for an appointment, when your regular [facility] provider is not available (evenings, weekends or holidays), or when youre out of town and need minor care.   Electronic visits cost only $49 and if the [facility] 24/7 provider determines a prescription is needed to treat your condition, one can be electronically transmitted to a nearby pharmacy*. Please take a moment to enroll today if you have not already done so. The enrollment process is free and takes just a few minutes. To enroll, please download the [employer] 24/7 lanre to your tablet or phone, or visit [email or web address] to enroll on your computer. And, as an [address] [place] [address] patient with a Managed by Q account, the results of your visits will be scanned into your electronic medical record and your primary care provider will be able to view the scanned results. We urge you to continue to see your regular [employer] provider for your ongoing medical care. And while your primary care provider may not be the one available when you seek a [employer] virtual visit, the peace of mind you get from getting a real diagnosis real time can be priceless. For more information on [employer], view our Frequently Asked Questions (FAQs) at [email or web address]. Sincerely, 
 
Goran Alexander MD 
Chief Medical Officer [employer] *:  certain medications cannot be prescribed via [employer] Providers Seen During Your Hospitalization Provider Specialty Primary office phone Rosario Graves MD Orthopedic Surgery [phone] Your Primary Care Physician (PCP) Primary Care Physician Office Phone Office Fax Lise Solitario [phone] You are allergic to the following No active allergies Recent Documentation Height Weight BMI Smoking Status 1.854 m 100.7 kg 29.29 kg/m2 Never Smoker Emergency Contacts Name Discharge Info Relation Home Work Mobile Frida Melgoza DISCHARGE CAREGIVER [3] Spouse [3] [phone] Patient Belongings The following personal items are in your possession at time of discharge: Visual Aid: Glasses, At bedside, With patient             Clothing:  (bag of clothes returned to patient in pacu)    Other Valuables: Tiesha Drain (returned to patient in pacu) Please provide this summary of care documentation to your next provider. Signatures-by signing, you are acknowledging that this After Visit Summary has been reviewed with you and you have received a copy. Patient Signature:  ____________________________________________________________ Date:  ____________________________________________________________  
  
[facility] Signature:  ____________________________________________________________ Date:  ____________________________________________________________

## 2018-07-31 NOTE — ANESTHESIA PREPROCEDURE EVALUATION
Anesthetic History No history of anesthetic complications Review of Systems / Medical History Patient summary reviewed, nursing notes reviewed and pertinent labs reviewed Pulmonary Within defined limits Neuro/Psych Within defined limits Cardiovascular Hypertension: well controlled GI/Hepatic/Renal 
Within defined limits Endo/Other Diabetes: well controlled, type 2 Arthritis Other Findings Physical Exam 
 
Airway Mallampati: II 
TM Distance: > 6 cm Neck ROM: normal range of motion Mouth opening: Normal 
 
 Cardiovascular Regular rate and rhythm,  S1 and S2 normal,  no murmur, click, rub, or gallop Dental 
No notable dental hx Pulmonary Breath sounds clear to auscultation Abdominal 
GI exam deferred Other Findings Anesthetic Plan ASA: 2 Anesthesia type: spinal 
 
 
 
 
Induction: Intravenous Anesthetic plan and risks discussed with: Patient

## 2018-07-31 NOTE — OP NOTES
[address]   OPERATIVE REPORT    Carlos Abarca  MR#: [id number]  : 1945  ACCOUNT #: [de-identified]   DATE OF SERVICE: 2018    SURGEON:  Gayle Jimenez MD     ASSISTANT:  ESTEFANY Dominguez    PREOPERATIVE DIAGNOSIS:  Lateral compartment osteoarthritis of the right knee. POSTOPERATIVE DIAGNOSIS:  Lateral compartment osteoarthritis of the right knee. PROCEDURE PERFORMED:  Right lateral unicompartmental knee replacement. IMPLANTS:  DePuy Sigma HP size 5 femur, size 4 tibial tray with 7 mm polyethylene insert. ANESTHESIA:  Spinal with sedation as well as adductor canal block. COMPLICATIONS:  None. ESTIMATED BLOOD LOSS:  50 mL. SPECIMENS REMOVED:  None. INDICATIONS:  The patient is a 67-year-old gentleman with progressive severe right lateral knee pain due to lateral compartment osteoarthritis. Symptoms have progressed despite comprehensive conservative treatment, and he presents for a lateral unicompartmental replacement versus total knee replacement. Risks, benefits and alternatives of procedure were reviewed with him in detail and he desires to proceed. PROCEDURE IN DETAIL:  The anesthesia team placed an adductor canal block before taking the patient to the operating room where they also placed a spinal.  Preoperative IV antibiotics were administered. Browning catheter was inserted and a padded pneumatic tourniquet was placed around the right upper thigh. Right lower extremity was prepped and draped in the usual sterile fashion. Through a midline anterior knee incision, I performed a short lateral parapatellar arthrotomy. Joint was inspected. Patellofemoral joint was normal and visualized portion of the medial femoral condyle was normal.  We proceeded with a lateral unicompartmental replacement. The arthrotomy was extended proximally to mobilize the patella medially.   I made a neutral proximal tibial resection using an extramedullary alignment guide. Care was taken to match the patient's native posterior slope. With spacer blocks the flexion and extension gaps were symmetrical with approximately 2 mm of opening to valgus stress in both extension and at 90 degrees of flexion. Distal femoral cut was made based off the tibia with the knee in extension. Femur was sized to a 5. Appropriate femoral rotation was marked with articulation of the center of the tibial spacer block with the knee at 90 degrees of flexion and full extension. Femoral preparation was completed and lateral meniscus was excised. Femoral trial was inserted, and with the 7 mm spacer block in place the knee had satisfactory soft tissue tension and stability throughout arc of motion. Tracking was satisfactory. Trials were removed and periarticular soft tissues were injected with a solution containing 0.5% ropivacaine with epinephrine as well as clonidine and Toradol. Bony surfaces were copiously irrigated by pulse lavage and dried before the real components were cemented into place using antibiotic impregnated cement. Excess cement was removed and knee was reduced in full extension with the real insert in place during cement set up. Tourniquet was released and hemostasis obtained with the Bovie. The wound was irrigated. Arthrotomy was closed with a combination of heavy Vicryl sutures and a running #2 Stratafix suture. Skin and subcutaneous layers were closed in layered fashion with Vicryl and a running Monocryl subcuticular stitch. The wound was dressed with Dermabond and an Aquacel occlusive dressing as well as a sterile compressive dressing. The patient was transported to the post-anesthesia care unit in stable condition. All counts were correct at the end of the procedure.       MD Basia Loyd / OSKAR  D: 07/31/2018 09:29     T: 07/31/2018 10:36  JOB #: [id number]  CC: Arie Yang MD  CC: Saran Deluca MD

## 2018-07-31 NOTE — PERIOP NOTES
TRANSFER - OUT REPORT:    Verbal report given to Renee(name) on Maxime Winkler  being transferred to (unit) for routine post - op       Report consisted of patients Situation, Background, Assessment and   Recommendations(SBAR). Time Pre op antibiotic [id number]:[id number]  Anesthesia Stop time: 4899  Browning Present on Transfer to floor:no  Order for Browning on Chart:no  Discharge Prescriptions with Chart:no    Information from the following report(s) SBAR, OR Summary, Intake/Output and Recent Results was reviewed with the receiving nurse. Opportunity for questions and clarification was provided. Is the patient on 02? NO         Is the patient on a monitor? NO    Is the nurse transporting with the patient? NO    Surgical Waiting Area notified of patient's transfer from PACU? YES      The following personal items collected during your admission accompanied patient upon transfer:   Dental Appliance:    Vision:    Hearing Aid:    Jewelry:    Clothing: Clothing:  (bag of clothes returned to patient in pacu)  Other Valuables:  Other Valuables: Cedric Nichols (returned to patient in pacu)  Valuables sent to safe:

## 2018-07-31 NOTE — BRIEF OP NOTE
BRIEF OPERATIVE NOTE    Date of Procedure: 7/31/2018   Preoperative Diagnosis: OA RIGHT KNEE   Postoperative Diagnosis: OA RIGHT KNEE     Procedure(s):  RIGHT LATERAL UNICONDYLAR VS RIGHT TOTAL KNEE ARTHROPLASTY ( SPINAL IV SED )  Surgeon(s) and Role:     * Prachi Joyner MD - Primary         Surgical Assistant: Raul Smiley    Surgical Staff:  Circ-1: Torito Harden RN  Circ-2: Alla Gonsalez  Circ-Relief: Claudette Reese RN  Physician Assistant: Eliz Brown PA-C  Scrub Tech-1: Chanell Mejia  Scrub Tech-2: Rigo Bhat  Surg Asst-1: Kd Ball  Event Time In   Incision Start 3660   Incision Close      Anesthesia: Spinal   Estimated Blood Loss: 50  Specimens: * No specimens in log *   Findings: severe lateral DJD   Complications: none  Implants:   Implant Name Type Inv.  Item Serial No.  Lot No. LRB No. Used Action   CEMENT BNE GENTAMC MV 40GM -- SMARTSET ENDURANCE - SNA  CEMENT BNE GENTAMC MV 40GM -- SMARTSET ENDURANCE NA [facility] [id number] Right 1 Implanted   FEM UNI HP SIGMA SZ5 LM/RL --  - SNA  FEM UNI HP SIGMA SZ5 LM/RL --  [facility] [id number] Right 1 Implanted   BASEPLT TIB UNI HP SZ4 LMRL -- SIGMA - SNA  BASEPLT TIB UNI HP SZ4 LMRL -- SIGMA NA [facility] [id number] Right 1 Implanted   INSERT UNI HP SZ4 7MM LM/RL -- SIGMA - SNA   INSERT UNI HP SZ4 7MM LM/RL -- SIGMA NA [facility] [employer] [id number] Right 1 Implanted

## 2018-07-31 NOTE — ANESTHESIA PROCEDURE NOTES
Peripheral Block Start time: 7/31/2018 6:56 AM 
End time: 7/31/2018 7:01 AM 
Performed by: Jesika Andre Authorized by: Jesika Andre Pre-procedure: Indications: at surgeon's request and post-op pain management Preanesthetic Checklist: patient identified, risks and benefits discussed, site marked, timeout performed, anesthesia consent given and patient being monitored Timeout Time: 06:56 Block Type:  
Block Type: Adductor canal 
Laterality:  Right Monitoring:  Standard ASA monitoring, continuous pulse ox, frequent vital sign checks, heart rate, responsive to questions and oxygen Injection Technique:  Single shot Procedures: ultrasound guided Patient Position: supine Prep: DuraPrep Needle Type:  Stimuplex Needle Gauge:  22 G Needle Localization:  Ultrasound guidance Medication Injected:  0.5% 
ropivacaine Add'l Medication Injected:  0.2% 
ropivacaine Volume (mL):  30 
 
Assessment: 
Number of attempts:  1 Injection Assessment:  Incremental injection every 5 mL, local visualized surrounding nerve on ultrasound, negative aspiration for blood, no paresthesia, no intravascular symptoms and negative aspiration for CSF Patient tolerance:  Patient tolerated the procedure well with no immediate complications

## 2018-07-31 NOTE — PROGRESS NOTES
NP ORTHO PROGRESS NOTE  Admit date: 7/31/2018  Date of Surgery: 7/31/2018   Procedures: Procedure(s):  RIGHT LATERAL UNICONDYLAR VS RIGHT TOTAL KNEE ARTHROPLASTY ( SPINAL IV SED )  Admitting Physician: Silvia Navas MD   Surgeon: Yumiko Harris) and Role:     * Silvia Navas MD - Primary    Chart/Meds/Labs Reviewed  Current Facility-Administered Medications   Medication Dose Route Frequency    [START ON 8/1/2018] finasteride (PROSCAR) tablet 5 mg  5 mg Oral DAILY    [START ON 8/1/2018] glimepiride (AMARYL) tablet 2 mg  2 mg Oral 7am    [START ON 8/1/2018] metFORMIN (GLUCOPHAGE) tablet 1,000 mg  1,000 mg Oral BID WITH MEALS    0.9% sodium chloride infusion  125 mL/hr IntraVENous CONTINUOUS    sodium chloride 0.9 % bolus infusion 500 mL  500 mL IntraVENous ONCE PRN    sodium chloride (NS) flush 5-10 mL  5-10 mL IntraVENous Q8H    sodium chloride (NS) flush 5-10 mL  5-10 mL IntraVENous PRN    acetaminophen (TYLENOL) tablet 500 mg  500 mg Oral Q4HWA    oxyCODONE IR (ROXICODONE) tablet 5 mg  5 mg Oral Q3H PRN    oxyCODONE IR (ROXICODONE) tablet 10 mg  10 mg Oral Q3H PRN    naloxone (NARCAN) injection 0.4 mg  0.4 mg IntraVENous PRN    ondansetron (ZOFRAN) injection 4 mg  4 mg IntraVENous Q4H PRN    hydrOXYzine HCl (ATARAX) tablet 10 mg  10 mg Oral Q8H PRN    senna-docusate (PERICOLACE) 8.6-50 mg per tablet 1 Tab  1 Tab Oral BID    [START ON 8/1/2018] polyethylene glycol (MIRALAX) packet 17 g  17 g Oral DAILY    [START ON 8/1/2018] bisacodyl (DULCOLAX) suppository 10 mg  10 mg Rectal DAILY PRN    HYDROmorphone (DILAUDID) syringe 0.5 mg  0.5 mg IntraVENous Q3H PRN    ketorolac (TORADOL) injection 15 mg  15 mg IntraVENous Q6H    ceFAZolin (ANCEF) 2 g/20 mL in sterile water IV syringe  2 g IntraVENous Q8H    aspirin delayed-release tablet 81 mg  81 mg Oral BID    [START ON 8/1/2018] lisinopril/hydroCHLOROthiazide(PRINZIDE/ZESTORETIC) 20/25 mg   Oral DAILY    glucose chewable tablet 16 g  4 Tab Oral PRN    dextrose (D50W) injection syrg 12.5-25 g  12.5-25 g IntraVENous PRN    glucagon (GLUCAGEN) injection 1 mg  1 mg IntraMUSCular PRN    insulin lispro (HUMALOG) injection   SubCUTAneous AC&HS       Subjective:    Complaints: None  Denies Dizziness, CP, SOB, N/V, Abdominal pain, numbness or tingling of extremities. Able to perform ankle pumps easily. Progressing with mobility this afternoon with PT.    +DM he states for 1 year, on metformin and amaryl with good control, followed by PCP--Tolerating regular diet, just ate ~1 hour ago. Does SMBG at home. Has not yet voided since transfer from PACU, but states he was straight cathed prior to transfer without any problems. [facility]. Incisional pain control: well controlled  Pain Control:   Pain Assessment  Pain Scale 1: Numeric (0 - 10)  Pain Intensity 1: 0  Pain Location 1: Knee  Pain Orientation 1: Right  Pain Description 1: Aching  Pain Intervention(s) 1: Rest  l    Objective:  General: Alert,Ox4, cooperative, NAD  HEENT: Atraumatic, PERRL, anicteric sclerae  Lungs: Bilateral expansion. Equal excursion. No accessory muscle use. Gastrointestinal:  Soft, non-tender, non-distended  Extremities:  Neurovasc exam WDL. + DP pulses. Sensation intact to light touch. Motor: + DF/PF          Calves non-tender upon palpation or with passive stretch. No significant erythema or swelling. Ace-wrapped Dressing: clean, dry and intact.   NS infusing via peripheral IV  Vital Signs:   Visit Vitals    /74 (BP 1 Location: Right arm, BP Patient Position: At rest)    Pulse (!) 56    Temp 97.9 °F (36.6 °C)    Resp 16    Ht 6' 1\" (1.854 m)    Wt 100.7 kg (222 lb)    SpO2 97%    BMI 29.29 kg/m2    O2 Flow Rate (L/min): 2 l/min O2 Device: Room air   Patient Vitals for the past 24 hrs:   BP Temp Pulse Resp SpO2 Height Weight   07/31/18 1536 127/74 97.9 °F (36.6 °C) (!) 56 16 97 % - -   07/31/18 1441 141/65 97.9 °F (36.6 °C) (!) 51 17 98 % - -   18 1339 134/71 97.3 °F (36.3 °C) (!) 49 16 98 % - -   18 1230 118/67 - (!) 46 8 99 % - -   18 1200 116/68 - (!) 47 (!) 5 98 % - -   18 1130 112/63 - (!) 50 (!) 6 98 % - -   18 1100 120/63 - (!) 54 15 94 % - -   18 1030 104/61 - (!) 55 15 99 % - -   18 1015 110/54 - (!) 54 11 97 % - -   18 1005 97/57 - (!) 54 12 98 % - -   18 1000 98/53 - (!) 56 14 98 % - -   18 0955 109/60 - (!) 58 14 96 % - -   18 0954 99/60 97.7 °F (36.5 °C) (!) 56 13 95 % - -   18 0709 138/83 - (!) 58 16 98 % - -   18 0617 140/76 98.3 °F (36.8 °C) (!) 59 17 97 % - -   18 0745 - - - - - 6' 1\" (1.854 m) 100.7 kg (222 lb)     Temp (24hrs), Av.8 °F (36.6 °C), Min:97.3 °F (36.3 °C), Max:98.3 °F (36.8 °C)      Intake/output-last 8 hours:    07 -  1900  In: 9714 [P.O.:200; I.V.:1450]  Out: 350 [Urine:300]    Intake/output- 24 hours:       LAB:   Recent Results (from the past 24 hour(s))   GLUCOSE, POC    Collection Time: 18  6:39 AM   Result Value Ref Range    Glucose (POC) 145 (H) 65 - 100 mg/dL    Performed by Loki Gilbert, POC    Collection Time: 18 10:01 AM   Result Value Ref Range    Glucose (POC) 131 (H) 65 - 100 mg/dL    Performed by Floresita TylerReema    GLUCOSE, POC    Collection Time: 18  4:15 PM   Result Value Ref Range    Glucose (POC) 165 (H) 65 - 100 mg/dL    Performed by Sidra Heller       Lab Results   Component Value Date/Time    INR POC 1.0 2018 02:16 PM     No results found for: HGB, HGBEXT  No results for input(s): NA, K, CL, BUN, CREA, GLU, CA, MG, PHOS, ALB, TBIL, TBILI, SGOT in the last 72 hours.     No lab exists for component: ALT;3    PT/OT:   Gait:  Gait  Base of Support: Widened  Speed/Scarlet: Pace decreased (<100 feet/min), Slow  Step Length: Left shortened, Right shortened  Stance: Right decreased  Gait Abnormalities: Antalgic, Decreased step clearance  Ambulation - Level of Assistance: Contact guard assistance  Distance (ft): 75 Feet (ft)  Assistive Device: Gait belt, Walker, rolling                 PATIENT MOBILITY  Bed Mobility Training  Supine to Sit: Supervision  Sit to Supine: Supervision  Scooting: Supervision  Transfer Training  Sit to Stand: Contact guard assistance  Stand to Sit: Stand-by assistance      Gait Training  Assistive Device: Gait belt, Walker, rolling  Ambulation - Level of Assistance: Contact guard assistance  Distance (ft): 75 Feet (ft)            Assessment and Plan    Active Problems:    Primary osteoarthritis of one knee, right (7/31/2018)    DM: Slightly elevated BG now, related to eating & expected postop. will change diet to diabetic. Patient well aware of diet restrictions. Continue to monitor overnight. POD#0 Procedure(s):  RIGHT LATERAL UNICONDYLAR ( SPINAL IV SED )  Stable postop  VTE prophylaxis: ASA, Mobilization, Ankle pumping exercises, SCDs   Weight bearing:  WBAT  Pain management:  Multi-modal pain plan, see above for medication,  Ice packs & elevation of extremity per orders, active gentle ROM & mobilize frequently for short periods of time. PT  Wound benign. Neurovascularly intact. Progressing as expected postop. Continue present plans per Dr. Kenzie Escobedo team for joint replacement. Discharge Planning: Goal is home with home care services, pending progress.   Plans per Dr. Janis Best    Signed By: Jihan Rebolledo NP    RN, MSN, MA, Adult NP-BC

## 2018-07-31 NOTE — IP AVS SNAPSHOT
[address] [place] [address] 
[phone] Patient: Unknown Galeazzi MRN: [id number] WSK:[birth year] A check zari indicates which time of day the medication should be taken. My Medications START taking these medications Instructions Each Dose to Equal  
 Morning Noon Evening Bedtime  
 acetaminophen 500 mg tablet Commonly known as:  TYLENOL Your last dose was: Your next dose is: Take 1 Tab by mouth every four (4) hours (while awake). 500 mg  
    
   
   
   
  
 aspirin delayed-release 81 mg tablet Your last dose was: Your next dose is: Take 1 Tab by mouth two (2) times a day. Indications: for blood clot prevention 81 mg  
    
   
   
   
  
 oxyCODONE IR 5 mg immediate release tablet Commonly known as:  Tonny Champagne Your last dose was: Your next dose is:    
   
   
 1-2 tablets by mouth every 4-6 hours as needed for pain  
     
   
   
   
  
 senna-docusate 8.6-50 mg per tablet Commonly known as:  Leellen Cancel Your last dose was: Your next dose is: Take 1 Tab by mouth two (2) times a day. 1 Tab CONTINUE taking these medications Instructions Each Dose to Equal  
 Morning Noon Evening Bedtime  
 finasteride 5 mg tablet Commonly known as:  PROSCAR Your last dose was: Your next dose is: Take 5 mg by mouth daily. 5 mg  
    
   
   
   
  
 glimepiride 2 mg tablet Commonly known as:  AMARYL Your last dose was: Your next dose is: Take 1 Tab by mouth every morning. 2 mg  
    
   
   
   
  
 lisinopril-hydroCHLOROthiazide 20-25 mg per tablet Commonly known as:  Grace Schilder Your last dose was: Your next dose is: TAKE 1 TABLET BY MOUTH EVERY DAY  
     
   
   
   
  
 metFORMIN 1,000 mg tablet Commonly known as:  GLUCOPHAGE Your last dose was: Your next dose is: TAKE 1 TAB BY MOUTH TWO (2) TIMES DAILY (WITH MEALS). Where to Get Your Medications Information on where to get these meds will be given to you by the nurse or doctor. ! Ask your nurse or doctor about these medications  
  acetaminophen 500 mg tablet  
 aspirin delayed-release 81 mg tablet  
 oxyCODONE IR 5 mg immediate release tablet  
 senna-docusate 8.6-50 mg per tablet

## 2018-07-31 NOTE — PROGRESS NOTES
Problem: Mobility Impaired (Adult and Pediatric)  Goal: *Acute Goals and Plan of Care (Insert Text)  Physical Therapy Goals  Initiated 7/31/2018    1. Patient will move from supine to sit and sit to supine  in bed with modified independence within 4 days. 2. Patient will perform sit to stand with modified independence within 4 days. 3. Patient will ambulate with modified independence for 200 feet with the least restrictive device within 4 days. 4. Patient will ascend/descend 12 stairs with 1 handrail(s) with modified independence within 4 days. 5. Patient will perform home exercise program per protocol with modified independence within 4 days. 6. Patient will demonstrate AROM 0-90 degrees in operative joint within 4 days. physical Therapy knee EVALUATION  Patient: Mac Richardson (37 y.o. male)  Date: 7/31/2018  Primary Diagnosis: OA RIGHT KNEE   Primary osteoarthritis of one knee, right  Procedure(s) (LRB):  RIGHT LATERAL UNICONDYLAR VS RIGHT TOTAL KNEE ARTHROPLASTY ( SPINAL IV SED ) (Right) Day of Surgery   Precautions:   Fall, WBAT    ASSESSMENT :  Based on the objective data described below, the patient presents with decreased functional mobility from baseline level of function. Prior to admit patient was independent with mobility. Lives with wife in a 2 level home with approx 5 ZAC (may stay on 1st floor initially). Already owns [facility] for home. Currently needing supervision for bed mobility and CGA for transfers. Amb approx 75 feet with RW and CGA with slow vamshi but no overt LOB. Patient instructed in use of ice and initial HEP. Recommend HH PT at DC to continue mobility progression. Anticipate patient will progress quickly. Patient will benefit from skilled intervention to address the above impairments.   Patients rehabilitation potential is considered to be Good  Factors which may influence rehabilitation potential include:   [x]         None noted  []         Mental ability/status  [] Medical condition  []         Home/family situation and support systems  []         Safety awareness  []         Pain tolerance/management  []         Other:      PLAN :  Recommendations and Planned Interventions:  [x]           Bed Mobility Training             [x]    Neuromuscular Re-Education  [x]           Transfer Training                   []    Orthotic/Prosthetic Training  [x]           Gait Training                         [x]    Modalities  [x]           Therapeutic Exercises           [x]    Edema Management/Control  [x]           Therapeutic Activities            [x]    Patient and Family Training/Education  []           Other (comment):    Frequency/Duration: Patient will be followed by physical therapy twice daily to address goals. Discharge Recommendations: Home Health  Further Equipment Recommendations for Discharge: none-owns RW and SPC     SUBJECTIVE:   Patient stated I'm not sure I am totally remembering everything correctly yet.     OBJECTIVE DATA SUMMARY:   HISTORY:    Past Medical History:   Diagnosis Date    Carcinoma in situ of prostate     Chronic pain     RIGHT KNEE    Diabetes mellitus with coincident hypertension ([facility] [address] 7/18/2018    NIDDM    Elevated prostate specific antigen (PSA)     Hypertension     Hypertrophy of prostate with urinary obstruction and other lower urinary tract symptoms (LUTS)     Gilbert    Low back pain     Peripheral neuropathy     Primary osteoarthritis of right knee 7/18/2018    Retention of urine, unspecified      Past Surgical History:   Procedure Laterality Date    ABDOMEN SURGERY PROC UNLISTED  4370    UMBILICAL HERNIA REPAIR    HX BACK SURGERY  1984    LUMBAR FUSION    HX CHOLECYSTECTOMY  2006    HX COLONOSCOPY      2007 nl    HX LUMBAR LAMINECTOMY       Prior Level of Function/Home Situation: Independent with mobility at baseline.   Lives with family and may stay on first floor of house initially  Personal factors and/or comorbidities impacting plan of care:     Home Situation  Home Environment: Private residence  # Steps to Enter: 5  Rails to Enter: Yes  Hand Rails : Bilateral (too far apart to reach both)  One/Two Story Residence: Two story  # of Interior Steps: 12  Interior Rails: Left  Lift Chair Available: No  Living Alone: No  Support Systems: Spouse/Significant Other/Partner, Family member(s)  Patient Expects to be Discharged to[de-identified] Private residence  Current DME Used/Available at Home: Walker, Raised toilet seat, Cane, straight    EXAMINATION/PRESENTATION/DECISION MAKING:   Critical Behavior:  Neurologic State: Alert  Orientation Level: Oriented X4  Cognition: Appropriate decision making, Appropriate for age attention/concentration, Appropriate safety awareness, Follows commands     Hearing: Auditory  Auditory Impairment: None    Range Of Motion:  AROM: Generally decreased, functional                       Strength:    Strength: Generally decreased, functional     Functional Mobility:  Bed Mobility:     Supine to Sit: Supervision  Sit to Supine: Supervision  Scooting: Supervision  Transfers:  Sit to Stand: Contact guard assistance  Stand to Sit: Stand-by assistance                       Balance:   Sitting: Intact  Standing: Intact; With support  Ambulation/Gait Training:  Distance (ft): 75 Feet (ft)  Assistive Device: Gait belt;Walker, rolling  Ambulation - Level of Assistance: Contact guard assistance     Gait Description (WDL): Exceptions to WDL  Gait Abnormalities: Antalgic;Decreased step clearance        Base of Support: Widened  Stance: Right decreased  Speed/Scarlet: Pace decreased (<100 feet/min); Slow  Step Length: Left shortened;Right shortened       Therapeutic Exercises:    Ankle pumps, heel slides, quad sets    Functional Measure:  Barthel Index:    Bathin  Bladder: 10  Bowels: 10  Groomin  Dressin  Feeding: 10  Mobility: 10  Stairs: 5  Toilet Use: 5  Transfer (Bed to Chair and Back): 10  Total: 70       Barthel and G-code impairment scale:  Percentage of impairment CH  0% CI  1-19% CJ  20-39% CK  40-59% CL  60-79% CM  80-99% CN  100%   Barthel Score 0-100 100 99-80 79-60 59-40 20-39 1-19   0   Barthel Score 0-20 20 17-19 13-16 9-12 5-8 1-4 0      The Barthel ADL Index: Guidelines  1. The index should be used as a record of what a patient does, not as a record of what a patient could do. 2. The main aim is to establish degree of independence from any help, physical or verbal, however minor and for whatever reason. 3. The need for supervision renders the patient not independent. 4. A patient's performance should be established using the best available evidence. Asking the patient, friends/relatives and nurses are the usual sources, but direct observation and common sense are also important. However direct testing is not needed. 5. Usually the patient's performance over the preceding 24-48 hours is important, but occasionally longer periods will be relevant. 6. Middle categories imply that the patient supplies over 50 per cent of the effort. 7. Use of aids to be independent is allowed. Scout Salmeron., Barthel, DRenettaW. (3022). Functional evaluation: the Barthel Index. [address] [facility] (14)2. Serena Law ancelmo LAKSHMI Santana, Mamadou Orantes., Kayleigh Lopez., [place], [address] [place] [address] (1999). Measuring the change indisability after inpatient rehabilitation; comparison of the responsiveness of the Barthel Index and Functional Whitt Measure. Journal of Neurology, Neurosurgery, and Psychiatry, 66(4), 485-385. Tigist Castañeda, N.J.A, OLIMPIA Amaya, & Leticia Obrien, MRenettaA. (2004.) Assessment of post-stroke quality of life in cost-effectiveness studies: The usefulness of the Barthel Index and the EuroQoL-5D. Quality of Life Research, 13, 688-91         G codes: In compliance with CMSs Claims Based Outcome Reporting, the following G-code set was chosen for this patient based on their primary functional limitation being treated:     The outcome measure chosen to determine the severity of the functional limitation was the Barthel with a score of 70/100 which was correlated with the impairment scale. ? Mobility - Walking and Moving Around:     - CURRENT STATUS: CJ - 20%-39% impaired, limited or restricted    - GOAL STATUS: CI - 1%-19% impaired, limited or restricted    - D/C STATUS:  ---------------To be determined---------------    Pain:  Pain Scale 1: Numeric (0 - 10)  Pain Intensity 1: 0  Pain Location 1: Knee  Pain Orientation 1: Right  Pain Description 1: Aching  Pain Intervention(s) 1: Rest  Activity Tolerance:   VSS  Please refer to the flowsheet for vital signs taken during this treatment. After treatment:   []         Patient left in no apparent distress sitting up in chair  [x]         Patient left in no apparent distress in bed  [x]         Call bell left within reach  [x]         Nursing notified  [x]         Caregiver present  []         Bed alarm activated    COMMUNICATION/EDUCATION:   The patients plan of care was discussed with: Physical Therapist, Occupational Therapist and Registered Nurse. [x]         Fall prevention education was provided and the patient/caregiver indicated understanding. [x]         Patient/family have participated as able in goal setting and plan of care. [x]         Patient/family agree to work toward stated goals and plan of care. []         Patient understands intent and goals of therapy, but is neutral about his/her participation. []         Patient is unable to participate in goal setting and plan of care.     Thank you for this referral.  Alverto Junior, PT, DPT   Time Calculation: 19 mins

## 2018-08-01 VITALS
RESPIRATION RATE: 16 BRPM | HEART RATE: 60 BPM | OXYGEN SATURATION: 98 % | TEMPERATURE: 98.7 F | DIASTOLIC BLOOD PRESSURE: 70 MMHG | HEIGHT: 73 IN | SYSTOLIC BLOOD PRESSURE: 145 MMHG | BODY MASS INDEX: 29.42 KG/M2 | WEIGHT: 222 LBS

## 2018-08-01 LAB
ANION GAP SERPL CALC-SCNC: 7 MMOL/L (ref 5–15)
BUN SERPL-MCNC: 20 MG/DL (ref 6–20)
BUN/CREAT SERPL: 25 (ref 12–20)
CALCIUM SERPL-MCNC: 7.8 MG/DL (ref 8.5–10.1)
CHLORIDE SERPL-SCNC: 109 MMOL/L (ref 97–108)
CO2 SERPL-SCNC: 24 MMOL/L (ref 21–32)
CREAT SERPL-MCNC: 0.81 MG/DL (ref 0.7–1.3)
GLUCOSE BLD STRIP.AUTO-MCNC: 119 MG/DL (ref 65–100)
GLUCOSE BLD STRIP.AUTO-MCNC: 120 MG/DL (ref 65–100)
GLUCOSE BLD STRIP.AUTO-MCNC: 145 MG/DL (ref 65–100)
GLUCOSE SERPL-MCNC: 136 MG/DL (ref 65–100)
HGB BLD-MCNC: 12 G/DL (ref 12.1–17)
POTASSIUM SERPL-SCNC: 3.9 MMOL/L (ref 3.5–5.1)
SERVICE CMNT-IMP: ABNORMAL
SODIUM SERPL-SCNC: 140 MMOL/L (ref 136–145)

## 2018-08-01 PROCEDURE — 36415 COLL VENOUS BLD VENIPUNCTURE: CPT | Performed by: PHYSICIAN ASSISTANT

## 2018-08-01 PROCEDURE — 74011250637 HC RX REV CODE- 250/637: Performed by: PHYSICIAN ASSISTANT

## 2018-08-01 PROCEDURE — 74011250636 HC RX REV CODE- 250/636: Performed by: PHYSICIAN ASSISTANT

## 2018-08-01 PROCEDURE — 80048 BASIC METABOLIC PNL TOTAL CA: CPT | Performed by: PHYSICIAN ASSISTANT

## 2018-08-01 PROCEDURE — 97530 THERAPEUTIC ACTIVITIES: CPT

## 2018-08-01 PROCEDURE — 85018 HEMOGLOBIN: CPT | Performed by: PHYSICIAN ASSISTANT

## 2018-08-01 PROCEDURE — 82962 GLUCOSE BLOOD TEST: CPT

## 2018-08-01 PROCEDURE — 97116 GAIT TRAINING THERAPY: CPT

## 2018-08-01 RX ORDER — ACETAMINOPHEN 500 MG
500 TABLET ORAL
Qty: 90 TAB | Refills: 0 | Status: SHIPPED
Start: 2018-08-01

## 2018-08-01 RX ORDER — AMOXICILLIN 250 MG
1 CAPSULE ORAL 2 TIMES DAILY
Qty: 60 TAB | Refills: 0 | Status: ON HOLD | OUTPATIENT
Start: 2018-08-01 | End: 2021-06-01

## 2018-08-01 RX ORDER — OXYCODONE HYDROCHLORIDE 5 MG/1
TABLET ORAL
Qty: 60 TAB | Refills: 0 | Status: ON HOLD | OUTPATIENT
Start: 2018-08-01 | End: 2021-06-01

## 2018-08-01 RX ORDER — ASPIRIN 81 MG/1
81 TABLET ORAL 2 TIMES DAILY
Qty: 60 TAB | Refills: 0 | Status: ON HOLD | OUTPATIENT
Start: 2018-08-01 | End: 2021-06-01

## 2018-08-01 RX ADMIN — KETOROLAC TROMETHAMINE 15 MG: 30 INJECTION, SOLUTION INTRAMUSCULAR; INTRAVENOUS at 10:08

## 2018-08-01 RX ADMIN — ASPIRIN 81 MG: 81 TABLET, DELAYED RELEASE ORAL at 08:59

## 2018-08-01 RX ADMIN — FINASTERIDE 5 MG: 5 TABLET, FILM COATED ORAL at 08:58

## 2018-08-01 RX ADMIN — OXYCODONE HYDROCHLORIDE 5 MG: 5 TABLET ORAL at 10:08

## 2018-08-01 RX ADMIN — Medication 10 ML: at 05:24

## 2018-08-01 RX ADMIN — GLIMEPIRIDE 2 MG: 2 TABLET ORAL at 07:16

## 2018-08-01 RX ADMIN — ACETAMINOPHEN 500 MG: 500 TABLET, FILM COATED ORAL at 14:19

## 2018-08-01 RX ADMIN — METFORMIN HYDROCHLORIDE 1000 MG: 500 TABLET, FILM COATED ORAL at 08:59

## 2018-08-01 RX ADMIN — POLYETHYLENE GLYCOL 3350 17 G: 17 POWDER, FOR SOLUTION ORAL at 08:58

## 2018-08-01 RX ADMIN — STANDARDIZED SENNA CONCENTRATE AND DOCUSATE SODIUM 1 TABLET: 8.6; 5 TABLET, FILM COATED ORAL at 08:59

## 2018-08-01 RX ADMIN — KETOROLAC TROMETHAMINE 15 MG: 30 INJECTION, SOLUTION INTRAMUSCULAR; INTRAVENOUS at 05:24

## 2018-08-01 RX ADMIN — ACETAMINOPHEN 500 MG: 500 TABLET, FILM COATED ORAL at 09:00

## 2018-08-01 RX ADMIN — HYDROCHLOROTHIAZIDE: 25 TABLET ORAL at 08:59

## 2018-08-01 RX ADMIN — ACETAMINOPHEN 500 MG: 500 TABLET, FILM COATED ORAL at 05:24

## 2018-08-01 NOTE — PROGRESS NOTES
[address]  SBAR Bundled Payment Handoff     FROM:                                TO: [facility]                                                      ([address] [place] [address] or Facility name)  [address]  [address] [address]  Dept: [address] [facility] [address] [facility] [address]: [phone]                                      Room#:  575/01                                                      Discharging Nurse:  Daniel Torrez VV#:[id number]         SITUATION      ASAScore: ASA 2 - Patient with mild systemic disease with no functional limitations    Admitted:  7/31/2018  Hospital Day: 2      Attending Provider:  Sallie Adame MD     Consultations:  None    PCP:  Felicia Rios MD   [phone]     Admitting Dx:  OA RIGHT KNEE   Primary osteoarthritis of one knee, right       Active Problems:    Primary osteoarthritis of one knee, right (7/31/2018)      1 Day Post-Op of   Procedure(s):  RIGHT LATERAL UNICONDYLAR VS RIGHT TOTAL KNEE ARTHROPLASTY ( SPINAL IV SED )   BY: Sallie Adame MD             ON: 7/31/2018                  Code Status: Full Code             Advance Directive? No Doesnt Have (Send w/patient)     Isolation:  There are currently no Active Isolations       MDRO: No current active infections    BACKGROUND     Allergies:  No Known Allergies    Past Medical History:   Diagnosis Date    Carcinoma in situ of prostate     Chronic pain     RIGHT KNEE    Diabetes mellitus with coincident hypertension ([facility] [address] 7/18/2018    NIDDM    Elevated prostate specific antigen (PSA)     Hypertension     Hypertrophy of prostate with urinary obstruction and other lower urinary tract symptoms (LUTS)     [place]    Low back pain     Peripheral neuropathy     Primary osteoarthritis of right knee 7/18/2018    Retention of urine, unspecified        Past Surgical History:   Procedure Laterality Date    ABDOMEN SURGERY PROC UNLISTED  0430    UMBILICAL HERNIA REPAIR    [facility] [address] LUMBAR FUSION    HX CHOLECYSTECTOMY  2006    HX COLONOSCOPY      2007 nl    HX LUMBAR LAMINECTOMY         Prior to Admission Medications   Prescriptions Last Dose Informant Patient Reported? Taking?   finasteride (PROSCAR) 5 mg tablet 7/30/2018 at Unknown time  Yes Yes   Sig: Take 5 mg by mouth daily. glimepiride (AMARYL) 2 mg tablet 7/30/2018 at Unknown time  No Yes   Sig: Take 1 Tab by mouth every morning. lisinopril-hydroCHLOROthiazide (PRINZIDE, ZESTORETIC) 20-25 mg per tablet 7/30/2018 at Unknown time  No Yes   Sig: TAKE 1 TABLET BY MOUTH EVERY DAY   metFORMIN (GLUCOPHAGE) 1,000 mg tablet 7/30/2018 at Unknown time  No Yes   Sig: TAKE 1 TAB BY MOUTH TWO (2) TIMES DAILY (WITH MEALS). Facility-Administered Medications: None       Vaccinations:    Immunization History   Administered Date(s) Administered    Influenza High Dose Vaccine PF 09/16/2016    Influenza Vaccine 12/10/2013    Pneumococcal Conjugate (PCV-13) 09/15/2016    Pneumococcal Polysaccharide (PPSV-23) 10/30/2013         ASSESSMENT   Age: 71 y.o.              Gender: male        Height: Height: 6' 1\" (185.4 cm)                    Weight:Weight: 100.7 kg (222 lb)     Patient Vitals for the past 8 hrs:   Temp Pulse Resp BP SpO2   08/01/18 0827 98.1 °F (36.7 °C) (!) 58 16 142/76 96 %            Active Orders   Diet    DIET DIABETIC CONSISTENT CARB Regular       Orientation: Orientation Level: Appropriate for age, Oriented X4    Active Lines/Drains:  (Peg Tube / [place] / CL or S/L?):no    Urinary Status: Voiding      Last BM: Last Bowel Movement Date: 07/30/18     Skin Integrity: Incision (comment)   Wound Knee Right-DRESSING STATUS: Clean, dry, and intact    Wound Knee Right-DRESSING TYPE: Aquacel    Mobility: Slightly limited   Weight Bearing Status: WBAT (Weight Bearing as Tolerated)      Gait Training  Assistive Device: Gait belt, Walker, rolling  Ambulation - Level of Assistance: Stand-by assistance  Distance (ft): 200 Feet (ft)  Stairs - Level of Assistance: Modified independent  Rail Use: Left  (+ cane)     On Anticoagulation? YES  Aspirin                                      Last dose:  8/1/2018at 0900    Pain Medications given:  Oxycodone                                Last dose: 8/1/2018 at  1008    Lab Results   Component Value Date/Time    Glucose 136 (H) 08/01/2018 05:30 AM    Hemoglobin A1c 6.6 (H) 07/23/2018 08:50 AM    INR POC 1.0 07/18/2018 02:16 PM    HGB 12.0 (L) 08/01/2018 05:30 AM       Readmission Risks:  Score:         RECOMMENDATION     See After Visit Summary (AVS) for:  · Discharge instructions  · After [facility]   · Medication Reconciliation          [facility] Orthopaedic Nurse Navigator  PAMELA Schwartz, RN-BC       Office  [phone]  Cell      [phone]  Fax      [phone]  [email or web address]             . Phy

## 2018-08-01 NOTE — PROGRESS NOTES
Care Management Interventions  PCP Verified by CM: Yes Virgie Roche MD)  Palliative Care Criteria Met (RRAT>21 & CHF Dx)?: No  Mode of Transport at Discharge: Other (see comment) (family)  Transition of Care Consult (CM Consult): Home Health, Discharge Planning  [address] [address]: No  Reason Outside [place]: Physician referred to specific agency (Patient prefers [facility]. )  Discharge Durable Medical Equipment: No (patient owns cane and walker)  Health Maintenance Reviewed: Yes  Physical Therapy Consult: Yes  Occupational Therapy Consult: No  Speech Therapy Consult: No  Current Support Network: Lives with Spouse, Own Home  Confirm Follow Up Transport: Family  Freedom of Choice Offered: Yes   Resource Information Provided?: No  Discharge Location  Discharge Placement: Home with home health     Reason for Admission:   Lots of discharges and I am leaving early today but                   RRAT Score:   19               Do you (patient/family) have any concerns for transition/discharge? Patient has questions about medications. Lucas notify RN    Plan for utilizing home health:    Patient prefers [facility]. Referral sent. They have accepted patient. Likelihood of readmission? no            Transition of Care Plan:  Home with home health.     Susie Banuelos, BSW/CRM

## 2018-08-01 NOTE — DISCHARGE INSTRUCTIONS
Patient meets criteria for   BUNDLED PAYMENT   for Care Improvement Initiative Criteria    Contact Information for Orthopedic Nurse Navigator:      PAMELA Madden, RN-BC  R:[phone]  L:[phone]  E:[phone]      After Hospital Care Plan:  Discharge Instructions Uni Knee Replacement- Dr. Sarah Alves    Patient Name: David Guillaume  Date of procedure: 7/31/2018   Procedure: Procedure(s):  RIGHT LATERAL UNICONDYLAR VS RIGHT TOTAL KNEE ARTHROPLASTY ( SPINAL IV SED )  Surgeon: Gardenia Quezada) and Role:     * Bony Dumont MD - Primary  PCP: Nilton Ojeda MD  Date of discharge: No discharge date for patient encounter. Follow up appointments   Follow up with Dr. Sarah Alves in 4 weeks. Call [phone] to make an appointment.  If home health has been arranged for you the agency will contact you to arrange dates/times for visits. Please call them if you do not hear from them within 24 hours after you are discharged    When to call your Orthopaedic Surgeon: Call [phone]. If you call after 5pm or on a weekend, the on call physician will be contacted   Unrelieved pain   Signs of infection-if your incision is red; continues to have drainage; drainage has a foul odor or if you have a persistent fever over 101 degrees   Signs of a blood clot in your leg-calf pain, tenderness, redness, swelling of lower leg    When to call your Primary Care Physician:   Concerns about medical conditions such as diabetes, high blood pressure, asthma, congestive heart failure   Call if blood sugars are elevated, persistent headache or dizziness, coughing or congestion, constipation or diarrhea, burning with urination, abnormal heart rate    When to call 138icl go to the nearest emergency room   Acute onset of chest pain, shortness of breath, difficulty breathing      Activity   Weight bearing as tolerated with walker or crutches.  Refer to pages 23-31 of your handbook for instructions and pictures   Complete your Home Exercise Program daily as instructed by your therapist.  Refer to pages 33-41 of your handbook for instructions and pictures   Get up every one hour and walk (except at night when sleeping)   Do not drive or operate heavy machinery      Incision Care   The Aquacel (brown, waterproof) surgical dressing is to remain on your knee for 7 days. On the 7th day have someone gently peel the dressing off by carefully lifting the edge and stretching it slightly to break the adhesive seal   If you have steri-strips (small, white pieces of tape) on your incision, they may come off when you remove the Aquacel surgical dressing. This is okay. You may now leave your incision open to air   If your Aquacel dressing comes loose/off before the 7th day, you may replace it with a dry sterile gauze dressing; change it daily. Once you incision is not draining, you may leave it open to air   You may take a shower with the Aquacel dressing in place. Once the Aquacel is removed, you may shower and get your incision wet but do not submerge your incision under water in a bath tub, hot tub or swimming pool for 6 weeks after surgery. Preventing blood clots    Take Enteric coated Aspirin (delayed release) 81mg, one tablet twice a day for one month following surgery    Pain management   Take pain medication as prescribed; decrease the amount you use as your pain lessens   Avoid alcoholic beverages while taking pain medication  o Please be aware that many medications contain Tylenol. We do not want you to over medicate so please read the information below as a guide. Do not take more than 3 grams of Tylenol per day.  You may place an ice bag on your knee for 15-20 minutes after exercising    Diet   Resume usual diet; drink plenty of fluids; eat foods high in fiber   You may want to take a stool softener (such as Senokot-S or Colace) to prevent constipation while you are taking pain medication.   If constipation occurs, take a laxative (such as Dulcolax tablets, Milk of Magnesia, or a suppository)      [facility] Protocol (to be followed by [facility])  Nursing-per physicians order   Complete head to toe assessment, vital signs   Medication reconciliation   Review pain management   Manage chronic medical conditions    Physical Therapy-per physicians order  Weight bearing status:  Precautions at Admission: Fall, WBAT          Mobility Status:  Supine to Sit: Supervision  Sit to Stand: Contact guard assistance  Sit to Supine: Supervision     Gait:  Distance (ft): 75 Feet (ft)  Ambulation - Level of Assistance: Contact guard assistance  Assistive Device: Gait belt, Walker, rolling  Gait Abnormalities: Antalgic, Decreased step clearance    ADL status overall composite:                Physical Therapy   Assessment and evaluation-bed mobility; functional transfers (bed, chair, bathroom, stairs); ambulation with equipment, car transfers, shower transfers, safety and ability to get out of house in the event of an emergency   Review weight bearing as tolerated, wean from walker or crutches as tolerated   Discuss pain management   Review how to do ADLs. Refer to page 42 of patient handbook    Home Exercise Program-refer to pages 33-41 of patient handbook for exercises.

## 2018-08-01 NOTE — ANESTHESIA POSTPROCEDURE EVALUATION
Post-Anesthesia Evaluation and Assessment Patient: Jeannie Campos MRN: [id number]  SSN: [id number] YOB: 1945  Age: 71 y.o. Sex: male Cardiovascular Function/Vital Signs Visit Vitals  /77 (BP 1 Location: Right arm, BP Patient Position: At rest;Supine)  Pulse (!) 58  Temp 36.8 °C (98.3 °F)  Resp 17  Ht 6' 1\" (1.854 m)  Wt 100.7 kg (222 lb)  SpO2 97%  BMI 29.29 kg/m2 Patient is status post spinal anesthesia for Procedure(s): RIGHT LATERAL UNICONDYLAR VS RIGHT TOTAL KNEE ARTHROPLASTY ( SPINAL IV SED ). Nausea/Vomiting: None Postoperative hydration reviewed and adequate. Pain: 
Pain Scale 1: Numeric (0 - 10) (08/01/18 0519) Pain Intensity 1: 3 (08/01/18 0519) Managed Neurological Status:  
Neuro (WDL): Exceptions to WDL (07/31/18 1015) Neuro Neurologic State: Alert (07/31/18 1333) Orientation Level: Oriented X4 (07/31/18 1333) Cognition: Appropriate decision making; Appropriate for age attention/concentration; Appropriate safety awareness; Follows commands (07/31/18 1333) Speech: Clear (07/31/18 1333) LLE Motor Response: Purposeful (07/31/18 1333) RLE Motor Response: Purposeful (07/31/18 1333) At baseline Mental Status and Level of Consciousness: Arousable Pulmonary Status:  
O2 Device: Room air (07/31/18 1914) Adequate oxygenation and airway patent Complications related to anesthesia: None Post-anesthesia assessment completed. No concerns Signed By: Ruy Morris MD   
 August 1, 2018

## 2018-08-01 NOTE — PROGRESS NOTES
Orthopaedics Daily Progress Note                            Date of Surgery:  7/31/2018      Patient: Stuart Alonso   YOB: 1945  Age: 71 y.o. SUBJECTIVE:   1 Day Post-Op following RIGHT LATERAL UNICONDYLAR VS RIGHT TOTAL KNEE ARTHROPLASTY ( SPINAL IV SED ). The patient's post operative pain is controlled. No CP/SOB. No N/V. The patient's mobility will continue to be evaluated today during PT sessions. He states he ambulated yesterday with his nurse without issue. Pain is present but controlled. OBJECTIVE:     Vital Signs:      Visit Vitals    /77 (BP 1 Location: Right arm, BP Patient Position: At rest;Supine)    Pulse (!) 58    Temp 98.3 °F (36.8 °C)    Resp 17    Ht 6' 1\" (1.854 m)    Wt 100.7 kg (222 lb)    SpO2 97%    BMI 29.29 kg/m2       Physical Exam:  General: A&Ox3. The patient is cooperative, and in no acute distress. Respiratory: Respirations are unlabored. Surgical site(s): dressing clean, dry  Musculoskeletal: Calves are soft, supple, and non-tender upon palpation. Motor 5/5. Neurological:  Neurovascularly intact with good dorsi and plantar flexion.     Pulses symmetrical.    Laboratory Values:             Recent Results (from the past 12 hour(s))   GLUCOSE, POC    Collection Time: 07/31/18  9:07 PM   Result Value Ref Range    Glucose (POC) 136 (H) 65 - 100 mg/dL    Performed by Tad Breath    HEMOGLOBIN    Collection Time: 08/01/18  5:30 AM   Result Value Ref Range    HGB 12.0 (L) 12.1 - 03.2 g/dL   METABOLIC PANEL, BASIC    Collection Time: 08/01/18  5:30 AM   Result Value Ref Range    Sodium 140 136 - 145 mmol/L    Potassium 3.9 3.5 - 5.1 mmol/L    Chloride 109 (H) 97 - 108 mmol/L    CO2 24 21 - 32 mmol/L    Anion gap 7 5 - 15 mmol/L    Glucose 136 (H) 65 - 100 mg/dL    BUN 20 6 - 20 MG/DL    Creatinine 0.81 0.70 - 1.30 MG/DL    BUN/Creatinine ratio 25 (H) 12 - 20      GFR est AA >60 >60 ml/min/1.73m2    GFR est non-AA >60 >60 ml/min/1.73m2 Calcium 7.8 (L) 8.5 - 10.1 MG/DL   GLUCOSE, POC    Collection Time: 08/01/18  6:46 AM   Result Value Ref Range    Glucose (POC) 145 (H) 65 - 100 mg/dL    Performed by Dorris Fabry          PLAN:     S/P RIGHT LATERAL UNICONDYLAR VS RIGHT TOTAL KNEE ARTHROPLASTY ( SPINAL IV SED ) -Continue WBAT. -Mobilize and continue with PT/OT until discharged     Hemodynamics Hgb today is 12.0. Acute blood loss anemia as expected. Patient asymptomatic. Continue to monitor. Wound After compression dressing removed, no postop dressing changes necessary. Reinforce PRN. Post Operative Pain Pain Control: stable, mild-to-moderate joint symptoms intermittently, reasonably well controlled by current meds. DVT Prophylaxis Continue with SCD'S, Ankle Pump Exercises. ASA 81mg BID     Discharge Disposition Discharge plan: Home with home health pending PT clearance.        Signed By: Arthur Guerrero PA-C  August 1, 2018 8:16 AM

## 2018-08-01 NOTE — DISCHARGE SUMMARY
[address] [place] [address]    DISCHARGE SUMMARY     Patient: Timothy Price                             Medical Record Number: [id number]                : 1945  Age: 71 y.o. Admit Date: 2018  Discharge Date:    Admission Diagnosis: OA RIGHT KNEE   Primary osteoarthritis of one knee, right  Discharge Diagnosis: OA RIGHT KNEE   Procedures: Procedure(s):  RIGHT LATERAL UNICONDYLAR VS RIGHT TOTAL KNEE ARTHROPLASTY ( SPINAL IV SED )  Surgeon: Sherlyn Lee MD  Assistants: Katie Jade PA-C  Anesthesia: spinal  Complications: None     History of Present Illness: The patient is a 28-year-old gentleman with progressive severe right lateral knee pain due to lateral compartment osteoarthritis. Symptoms have progressed despite comprehensive conservative treatment, and he presents for a lateral unicompartmental replacement versus total knee replacement. Risks, benefits and alternatives of procedure were reviewed with him in detail and he desires to proceed. Procedure(s):  RIGHT LATERAL UNICONDYLAR VS RIGHT TOTAL KNEE ARTHROPLASTY ( SPINAL IV SED ), which he consented to undergo after a discussion of the risks, benefits, alternatives, rehab concerns, and potential complications of surgery. Hospital Course:  Timothy Price tolerated the procedure well. He was transferred  to the recovery room in stable condition. After a brief stay the patient was then transferred to the Joint Replacement Unit at [address] [place] [address].  Hemoglobin and INR prior to discharge were   Lab Results   Component Value Date/Time    HGB 12.0 (L) 2018 05:30 AM    INR POC 1.0 2018 02:16 PM   .  Timothy Price made satisfactory progress with physical therapy and was discharged to Home with home health in stable condition on postoperative day 1.   He was provided with routine postoperative instructions and advised to follow up in my office in 3 weeks following discharge from the hospital.  He was prescribed ASA 81mg BID for DVT prophylaxis and oxycodone for post-operative pain. Discharge Medications:  Current Discharge Medication List      START taking these medications    Details   acetaminophen (TYLENOL) 500 mg tablet Take 1 Tab by mouth every four (4) hours (while awake). Qty: 90 Tab, Refills: 0      aspirin delayed-release 81 mg tablet Take 1 Tab by mouth two (2) times a day. Indications: for blood clot prevention  Qty: 60 Tab, Refills: 0      oxyCODONE IR (ROXICODONE) 5 mg immediate release tablet 1-2 tablets by mouth every 4-6 hours as needed for pain  Qty: 60 Tab, Refills: 0    Associated Diagnoses: Primary osteoarthritis of right knee      senna-docusate (PERICOLACE) 8.6-50 mg per tablet Take 1 Tab by mouth two (2) times a day. Qty: 60 Tab, Refills: 0         CONTINUE these medications which have NOT CHANGED    Details   lisinopril-hydroCHLOROthiazide (PRINZIDE, ZESTORETIC) 20-25 mg per tablet TAKE 1 TABLET BY MOUTH EVERY DAY  Qty: 30 Tab, Refills: 4      metFORMIN (GLUCOPHAGE) 1,000 mg tablet TAKE 1 TAB BY MOUTH TWO (2) TIMES DAILY (WITH MEALS). Qty: 60 Tab, Refills: 5      glimepiride (AMARYL) 2 mg tablet Take 1 Tab by mouth every morning. Qty: 30 Tab, Refills: 5      finasteride (PROSCAR) 5 mg tablet Take 5 mg by mouth daily.                Signed by: Yusuf Mccann PA-C  8/1/2018

## 2018-08-01 NOTE — PROGRESS NOTES
Problem: Mobility Impaired (Adult and Pediatric)  Goal: *Acute Goals and Plan of Care (Insert Text)  Physical Therapy Goals  Initiated 7/31/2018    1. Patient will move from supine to sit and sit to supine  in bed with modified independence within 4 days. 2. Patient will perform sit to stand with modified independence within 4 days. 3. Patient will ambulate with modified independence for 200 feet with the least restrictive device within 4 days. 4. Patient will ascend/descend 12 stairs with 1 handrail(s) with modified independence within 4 days. 5. Patient will perform home exercise program per protocol with modified independence within 4 days. 6. Patient will demonstrate AROM 0-90 degrees in operative joint within 4 days. physical Therapy TREATMENT/DISCHARGE  Patient: Marietta Paniagua (78 y.o. male)  Date: 8/1/2018  Diagnosis: OA RIGHT KNEE   Primary osteoarthritis of one knee, right <principal problem not specified>  Procedure(s) (LRB):  RIGHT LATERAL UNICONDYLAR VS RIGHT TOTAL KNEE ARTHROPLASTY ( SPINAL IV SED ) (Right) 1 Day Post-Op  Precautions: Fall, WBAT  Chart, physical therapy assessment, plan of care and goals were reviewed. ASSESSMENT:  Patient has made good progress with activity tolerance and overall safety with mobility. He remains challenged with maintaining multiple gait improvements simultaneously and required changing cues to address gait quality. He has met his acute goals and is safe to discharge home with family as of this afternoon after gait training 200' and ascending/descending 8 stairs with 1 rail. Progression toward goals:  [x]          Improving appropriately and progressing toward goals  []          Improving slowly and progressing toward goals  []          Not making progress toward goals and plan of care will be adjusted     PLAN:  Patient will be discharged from physical therapy at this time.   Rationale for discharge:  [x]     Goals Achieved  []     [address]  [] Patient not participating in therapy  []     Other:  Discharge Recommendations:  Home Health  Further Equipment Recommendations for Discharge:  None     SUBJECTIVE:   Patient stated I don't know why I want to keep looking at my feet.     OBJECTIVE DATA SUMMARY:   Critical Behavior:  Neurologic State: Alert, Appropriate for age  Orientation Level: Appropriate for age, Oriented X4  Cognition: Appropriate decision making, Appropriate for age attention/concentration, Appropriate safety awareness, Follows commands     Range of Motion:              RLE PROM  R Knee Flexion: 90  R Knee Extension: -14           Functional Mobility Training:  Bed Mobility:     Supine to Sit: Independent  Sit to Supine: Supervision  Scooting: Supervision        Transfers:  Sit to Stand: Contact guard assistance;Supervision (CGA from low surfance, supervision from average)  Stand to Sit: Stand-by assistance                             Balance:  Sitting: Intact  Standing: Intact; With support  Ambulation/Gait Training:  Distance (ft): 200 Feet (ft)  Assistive Device: Gait belt;Walker, rolling  Ambulation - Level of Assistance: Stand-by assistance        Gait Abnormalities: Antalgic;Decreased step clearance        Base of Support: Widened  Stance: Right decreased  Speed/Scarlet: Pace decreased (<100 feet/min)  Step Length: Right lengthened;Left shortened     Stairs:     Stairs - Level of Assistance: Modified independent  Rail Use: Left  (+ cane)  Therapeutic Exercises:     EXERCISE   Sets   Reps   Active Active Assist   Passive Self ROM   Comments   Ankle Pumps 1 10 [x]                                           []                                           []                                           []                                              Quad Sets 1 10 [x]                                           []                                           []                                           [] Hamstring Sets 1 10 [x]                                           []                                           []                                           []                                              Short Arc Quads 1 10 [x]                                           []                                           []                                           []                                              Knee Extension Stretch 1 120 seconds   []                                             []                                             [x]                                             []                                              Heel Slides 1 10 []                                           [x]                                           []                                           []                                              Long Arc Quads   []                                           []                                           []                                           []                                              Knee Flexion Stretch   []                                           []                                           []                                           []                                              Straight Leg Raises   []                                           []                                           []                                           []                                                    Pain:  Pain Scale 1: Numeric (0 - 10)  Pain Intensity 1: 3  Pain Location 1: Knee  Pain Orientation 1: Right  Pain Description 1: Aching  Pain Intervention(s) 1: Repositioned; Rest    After treatment:   []  Patient left in no apparent distress sitting up in chair  [x]  Patient left in no apparent distress in bed  [x]  Call bell left within reach  [x]  Nursing notified  []  Caregiver present  []  Bed alarm activated    COMMUNICATION/COLLABORATION:   The patients plan of care was discussed with: Registered Nurse    Anson Moscoso, PT, DPT   Time Calculation: 25 mins

## 2018-08-01 NOTE — PROGRESS NOTES
Problem: Mobility Impaired (Adult and Pediatric)  Goal: *Acute Goals and Plan of Care (Insert Text)  Physical Therapy Goals  Initiated 7/31/2018    1. Patient will move from supine to sit and sit to supine  in bed with modified independence within 4 days. 2. Patient will perform sit to stand with modified independence within 4 days. 3. Patient will ambulate with modified independence for 200 feet with the least restrictive device within 4 days. 4. Patient will ascend/descend 12 stairs with 1 handrail(s) with modified independence within 4 days. 5. Patient will perform home exercise program per protocol with modified independence within 4 days. 6. Patient will demonstrate AROM 0-90 degrees in operative joint within 4 days. physical Therapy TREATMENT  Patient: David Guillaume (11 y.o. male)  Date: 8/1/2018  Diagnosis: OA RIGHT KNEE   Primary osteoarthritis of one knee, right <principal problem not specified>  Procedure(s) (LRB):  RIGHT LATERAL UNICONDYLAR VS RIGHT TOTAL KNEE ARTHROPLASTY ( SPINAL IV SED ) (Right) 1 Day Post-Op  Precautions: Fall, WBAT  Chart, physical therapy assessment, plan of care and goals were reviewed. ASSESSMENT:  Patient progressing well towards goals. He c/o additional pain this am but was controlled with pain meds. He lives with a supportive family and has a RW for home. He was able to progress to 150' with RW with SBA. Cues required to look forward, improve step length, and increase vamshi. He anticipates discharge this afternoon and will follow up for the PM to complete stairs before clearing for home with HHPT. Progression toward goals:  [x]      Improving appropriately and progressing toward goals  []      Improving slowly and progressing toward goals  []      Not making progress toward goals and plan of care will be adjusted     PLAN:  Patient continues to benefit from skilled intervention to address the above impairments.   Continue treatment per established plan of care.  Discharge Recommendations:  Home Health  Further Equipment Recommendations for Discharge:  None     SUBJECTIVE:   Patient stated What would you like to do? Mattie Brewer \"I think I sat down wrong this morning. I needed pain medicine after I got to the chair. \"    OBJECTIVE DATA SUMMARY:   Critical Behavior:  Neurologic State: Alert, Appropriate for age  Orientation Level: Appropriate for age, Oriented X4  Cognition: Appropriate decision making, Appropriate for age attention/concentration, Appropriate safety awareness, Follows commands     Range of Motion:              RLE PROM  R Knee Flexion: 90           Functional Mobility Training:  Bed Mobility:           Scooting: Supervision        Transfers:  Sit to Stand: Stand-by assistance  Stand to Sit: Stand-by assistance                             Balance:  Sitting: Intact  Standing: Intact; With support  Ambulation/Gait Training:  Distance (ft): 150 Feet (ft)  Assistive Device: Gait belt;Walker, rolling  Ambulation - Level of Assistance: Stand-by assistance        Gait Abnormalities: Antalgic;Decreased step clearance        Base of Support: Widened  Stance: Right decreased  Speed/Scarlet: Pace decreased (<100 feet/min)  Step Length: Left shortened                   Stairs:            Therapeutic Exercises:   Discussed and reviewed all bed ex for home    EXERCISE   Sets   Reps   Active Active Assist   Passive Self ROM   Comments   Ankle Pumps 1 10 [x]                                        []                                        []                                        []                                           Quad Sets   []                                        []                                        []                                        []                                           Hamstring Sets   []                                        []                                        []                                        [] Short Arc Quads   []                                        []                                        []                                        []                                           Knee Extension Stretch     []                                          []                                          []                                          []                                           Heel Slides   []                                        []                                        []                                        []                                           Long Arc Quads   []                                        []                                        []                                        []                                           Knee Flexion Stretch 1 10 []                                        [x]                                        []                                        []                                           Straight Leg Raises   []                                        []                                        []                                        []                                             Pain:  Pain Scale 1: Numeric (0 - 10)  Pain Intensity 1: 3  Pain Location 1: Knee  Pain Orientation 1: Right  Pain Description 1: Aching  Pain Intervention(s) 1: Repositioned; Rest  Activity Tolerance:     Please refer to the flowsheet for vital signs taken during this treatment.   After treatment:   [x] Patient left in no apparent distress sitting up in chair  [] Patient left in no apparent distress in bed  [x] Call bell left within reach  [x] Nursing notified  [] Caregiver present  [] Bed alarm activated    COMMUNICATION/COLLABORATION:   The patients plan of care was discussed with: Registered Nurse    Nils Ellington, PT, DPT   Time Calculation: 24 mins

## 2018-08-01 NOTE — PROGRESS NOTES
Problem: Discharge Planning  Goal: *Discharge to safe environment  Outcome: Progressing Towards Goal  Discharge disposition: home with home health.     VIVI Marie/CRM

## 2018-08-01 NOTE — PROGRESS NOTES
Bedside and Verbal shift change report given to Trang Qureshi RN (oncoming nurse) by Yosef Villatoro RN (offgoing nurse). Report included the following information SBAR, Kardex, OR Summary, Procedure Summary, Intake/Output, MAR and Recent Results.

## 2018-08-01 NOTE — PROGRESS NOTES
PT clearance per Rosanna Crimes set up per CRM, DC order per [facility]    I have reviewed discharge instructions with the patient. The patient verbalized understanding. All belongings packed and sent to DC; phone wallet keys glasses prescriptions, instructions. IV removed.

## 2018-08-03 ENCOUNTER — PATIENT OUTREACH (OUTPATIENT)
Dept: OTHER | Age: 73
End: 2018-08-03

## 2018-08-03 NOTE — PROGRESS NOTES
This note will not be viewable in [address] Post Discharge Follow-up contact after Joint Replacement Patient discharged on 8/1/18  By  [facility]  
following  right knee Arthroplasty. Spoke with patient today, who reports they are \" okay, I had a lot more swelling after I got home, but the therapist is helping me with the elevation and I am using ice. \" 
Denies Fever, Shortness of Breath or Chest Pain. Home Health has visited. Patient also reports:. Incision  clean, dry, intact Calf is non-tender,  
operative extremity has moderate swelling. Pain is well managed. Discussed use of ice & elevation. is progressing with therapy and is exercising independently. Taking Aspirin for anticoagulation, oxycodone for pain. Patient  
is not experiencing symptoms of constipation & urinating without difficulty. Discussed side effects of anticoagulants & pain medications (bleeding/bruising, constipation, lightheaded/dizziness) Follow up appointment is scheduled for 3 weeks. Discussed calling surgeon  [facility]  for drainage, bleeding, swelling in operative extremity, fever or pain. Discussed calling PCP Dr Violet Murray with other medical issues.

## 2020-10-17 ENCOUNTER — HOSPITAL ENCOUNTER (EMERGENCY)
Age: 75
Discharge: HOME | End: 2020-10-17
Attending: EMERGENCY MEDICINE
Payer: MEDICARE

## 2020-10-17 VITALS
HEIGHT: 70 IN | RESPIRATION RATE: 16 BRPM | BODY MASS INDEX: 32.64 KG/M2 | TEMPERATURE: 98 F | HEART RATE: 84 BPM | SYSTOLIC BLOOD PRESSURE: 153 MMHG | OXYGEN SATURATION: 98 % | DIASTOLIC BLOOD PRESSURE: 83 MMHG | WEIGHT: 228 LBS

## 2020-10-17 DIAGNOSIS — T18.108A IMPACTED FOREIGN BODY IN ESOPHAGUS, INITIAL ENCOUNTER: Primary | ICD-10-CM

## 2020-10-17 PROCEDURE — 99283 EMERGENCY DEPT VISIT LOW MDM: CPT

## 2020-10-17 NOTE — ED PROVIDER NOTES
The history is provided by the patient. Vomiting    This is a new problem. The current episode started 3 to 5 hours ago. The problem occurs continuously. The problem has not changed since onset. Emesis appearance: foam and spit. There has been no fever. Associated symptoms comments: Fullness in neck and upper chest after eating pulled pork and having a big bite followed by vomiting and inability tolerate liquids.         Past Medical History:   Diagnosis Date    Carcinoma in situ of prostate     Chronic pain     RIGHT KNEE    Diabetes mellitus with coincident hypertension ([address] 7/18/2018    NIDDM    Elevated prostate specific antigen (PSA)     Hypertension     Hypertrophy of prostate with urinary obstruction and other lower urinary tract symptoms (LUTS)     [place]    Low back pain     Peripheral neuropathy     Primary osteoarthritis of right knee 7/18/2018    Retention of urine, unspecified        Past Surgical History:   Procedure Laterality Date    ABDOMEN SURGERY PROC UNLISTED  3602    UMBILICAL HERNIA REPAIR    HX BACK SURGERY  1984    LUMBAR FUSION    HX CHOLECYSTECTOMY  2006    HX COLONOSCOPY      2007 nl    HX LUMBAR LAMINECTOMY           Family History:   Problem Relation Age of Onset    Heart Disease Father     Other Mother         BRAIN ANEURYSM    Anesth Problems Neg Hx        Social History     Socioeconomic History    Marital status:      Spouse name: Not on file    Number of children: Not on file    Years of education: Not on file    Highest education level: Not on file   Occupational History    Not on file   Social Needs    Financial resource strain: Not on file    Food insecurity     Worry: Not on file     Inability: Not on file    Transportation needs     Medical: Not on file     Non-medical: Not on file   Tobacco Use    Smoking status: Never Smoker    Smokeless tobacco: Never Used   Substance and Sexual Activity    Alcohol use: No    Drug use: No    Sexual activity: Not on file   Lifestyle    Physical activity     Days per week: Not on file     Minutes per session: Not on file    Stress: Not on file   Relationships    Social connections     Talks on phone: Not on file     Gets together: Not on file     Attends Church service: Not on file     Active member of club or organization: Not on file     Attends meetings of clubs or organizations: Not on file     Relationship status: Not on file    Intimate partner violence     Fear of current or ex partner: Not on file     Emotionally abused: Not on file     Physically abused: Not on file     Forced sexual activity: Not on file   Other Topics Concern    Not on file   Social History Narrative    Not on file         ALLERGIES: Patient has no known allergies. Review of Systems   Gastrointestinal: Positive for vomiting. All other systems reviewed and are negative. Vitals:    10/17/20 1806 10/17/20 1852 10/17/20 1915   BP: (!) 159/97 (!) 158/91 (!) 168/90   Pulse: 93     Resp: 18     Temp: 97.9 °F (36.6 °C)     SpO2: 96%  97%   Weight: 103.4 kg (228 lb)     Height: 5' 10\" (1.778 m)              Physical Exam  Vitals signs and nursing note reviewed. Constitutional:       General: He is not in acute distress. Appearance: He is well-developed. HENT:      Head: Normocephalic and atraumatic. Eyes:      Conjunctiva/sclera: Conjunctivae normal.   Neck:      Musculoskeletal: Neck supple. Trachea: No tracheal deviation. Cardiovascular:      Rate and Rhythm: Normal rate and regular rhythm. Pulmonary:      Effort: Pulmonary effort is normal. No respiratory distress. Abdominal:      General: There is no distension. Tenderness: There is no abdominal tenderness. Musculoskeletal: Normal range of motion. General: No deformity. Skin:     General: Skin is warm and dry. Neurological:      Mental Status: He is alert. Cranial Nerves: No cranial nerve deficit.    Psychiatric: Behavior: Behavior normal.          MDM     77 y.o. male presents with impacted esophageal pork. He is unable to tolerate liquids and is spitting and vomiting any intake on arrival. Handling secretions. Provided saltines and ginger ale which was able to pass the offending food into the stomach. He is now tolerating PO appropriately. Discussed small bites of food and scheduling non-emergent endoscopy at a later date to evaluate for stricture or mass. Plan to follow up with PCP as needed and return precautions discussed for worsening or new concerning symptoms.      Procedures

## 2020-10-17 NOTE — ED TRIAGE NOTES
Pt reports he has a piece of pork stuck in his throat. Pt is unable to tolerate secretions or water.

## 2021-05-28 ENCOUNTER — TRANSCRIBE ORDER (OUTPATIENT)
Dept: REGISTRATION | Age: 76
End: 2021-05-28

## 2021-05-28 ENCOUNTER — HOSPITAL ENCOUNTER (OUTPATIENT)
Dept: PREADMISSION TESTING | Age: 76
Discharge: HOME | End: 2021-05-28
Payer: MEDICARE

## 2021-05-28 DIAGNOSIS — Z01.812 PRE-PROCEDURE LAB EXAM: Primary | ICD-10-CM

## 2021-05-28 DIAGNOSIS — Z01.812 PRE-PROCEDURE LAB EXAM: ICD-10-CM

## 2021-05-28 PROCEDURE — U0003 INFECTIOUS AGENT DETECTION BY NUCLEIC ACID (DNA OR RNA); SEVERE ACUTE RESPIRATORY SYNDROME CORONAVIRUS 2 (SARS-COV-2) (CORONAVIRUS DISEASE [COVID-19]), AMPLIFIED PROBE TECHNIQUE, MAKING USE OF HIGH THROUGHPUT TECHNOLOGIES AS DESCRIBED BY CMS-2020-01-R: HCPCS

## 2021-05-29 LAB — SARS-COV-2, COV2NT: NOT DETECTED

## 2021-06-01 ENCOUNTER — ANESTHESIA EVENT (OUTPATIENT)
Dept: ENDOSCOPY | Age: 76
End: 2021-06-01
Payer: MEDICARE

## 2021-06-01 ENCOUNTER — ANESTHESIA (OUTPATIENT)
Dept: ENDOSCOPY | Age: 76
End: 2021-06-01
Payer: MEDICARE

## 2021-06-01 ENCOUNTER — HOSPITAL ENCOUNTER (OUTPATIENT)
Age: 76
Setting detail: OUTPATIENT SURGERY
Discharge: HOME | End: 2021-06-01
Attending: INTERNAL MEDICINE | Admitting: INTERNAL MEDICINE
Payer: MEDICARE

## 2021-06-01 VITALS
TEMPERATURE: 98.7 F | OXYGEN SATURATION: 96 % | HEIGHT: 73 IN | BODY MASS INDEX: 29.42 KG/M2 | SYSTOLIC BLOOD PRESSURE: 161 MMHG | WEIGHT: 222 LBS | HEART RATE: 51 BPM | RESPIRATION RATE: 12 BRPM | DIASTOLIC BLOOD PRESSURE: 80 MMHG

## 2021-06-01 PROCEDURE — 74011250636 HC RX REV CODE- 250/636: Performed by: NURSE ANESTHETIST, CERTIFIED REGISTERED

## 2021-06-01 PROCEDURE — 74011250637 HC RX REV CODE- 250/637: Performed by: INTERNAL MEDICINE

## 2021-06-01 PROCEDURE — 88312 SPECIAL STAINS GROUP 1: CPT

## 2021-06-01 PROCEDURE — 77030021593 HC FCPS BIOP ENDOSC BSC -A: Performed by: INTERNAL MEDICINE

## 2021-06-01 PROCEDURE — 88305 TISSUE EXAM BY PATHOLOGIST: CPT

## 2021-06-01 PROCEDURE — 74011000250 HC RX REV CODE- 250: Performed by: NURSE ANESTHETIST, CERTIFIED REGISTERED

## 2021-06-01 PROCEDURE — 76060000032 HC ANESTHESIA 0.5 TO 1 HR: Performed by: INTERNAL MEDICINE

## 2021-06-01 PROCEDURE — 77030013992 HC SNR POLYP ENDOSC BSC -B: Performed by: INTERNAL MEDICINE

## 2021-06-01 PROCEDURE — 76040000007: Performed by: INTERNAL MEDICINE

## 2021-06-01 PROCEDURE — 2709999900 HC NON-CHARGEABLE SUPPLY: Performed by: INTERNAL MEDICINE

## 2021-06-01 RX ORDER — DEXTROMETHORPHAN/PSEUDOEPHED 2.5-7.5/.8
1.2 DROPS ORAL
Status: DISCONTINUED | OUTPATIENT
Start: 2021-06-01 | End: 2021-06-01 | Stop reason: HOSPADM

## 2021-06-01 RX ORDER — SODIUM CHLORIDE 9 MG/ML
150 INJECTION, SOLUTION INTRAVENOUS CONTINUOUS
Status: DISCONTINUED | OUTPATIENT
Start: 2021-06-01 | End: 2021-06-01 | Stop reason: HOSPADM

## 2021-06-01 RX ORDER — MIDAZOLAM HYDROCHLORIDE 1 MG/ML
.25-5 INJECTION, SOLUTION INTRAMUSCULAR; INTRAVENOUS
Status: DISCONTINUED | OUTPATIENT
Start: 2021-06-01 | End: 2021-06-01 | Stop reason: HOSPADM

## 2021-06-01 RX ORDER — PROPOFOL 10 MG/ML
INJECTION, EMULSION INTRAVENOUS AS NEEDED
Status: DISCONTINUED | OUTPATIENT
Start: 2021-06-01 | End: 2021-06-01 | Stop reason: HOSPADM

## 2021-06-01 RX ORDER — SODIUM CHLORIDE 0.9 % (FLUSH) 0.9 %
5-40 SYRINGE (ML) INJECTION AS NEEDED
Status: DISCONTINUED | OUTPATIENT
Start: 2021-06-01 | End: 2021-06-01 | Stop reason: HOSPADM

## 2021-06-01 RX ORDER — ATROPINE SULFATE 0.1 MG/ML
0.5 INJECTION INTRAVENOUS
Status: DISCONTINUED | OUTPATIENT
Start: 2021-06-01 | End: 2021-06-01 | Stop reason: HOSPADM

## 2021-06-01 RX ORDER — SODIUM CHLORIDE 0.9 % (FLUSH) 0.9 %
5-40 SYRINGE (ML) INJECTION EVERY 8 HOURS
Status: DISCONTINUED | OUTPATIENT
Start: 2021-06-01 | End: 2021-06-01 | Stop reason: HOSPADM

## 2021-06-01 RX ORDER — LIDOCAINE HYDROCHLORIDE 20 MG/ML
INJECTION, SOLUTION EPIDURAL; INFILTRATION; INTRACAUDAL; PERINEURAL AS NEEDED
Status: DISCONTINUED | OUTPATIENT
Start: 2021-06-01 | End: 2021-06-01 | Stop reason: HOSPADM

## 2021-06-01 RX ORDER — FENTANYL CITRATE 50 UG/ML
200 INJECTION, SOLUTION INTRAMUSCULAR; INTRAVENOUS
Status: DISCONTINUED | OUTPATIENT
Start: 2021-06-01 | End: 2021-06-01 | Stop reason: HOSPADM

## 2021-06-01 RX ORDER — EPINEPHRINE 0.1 MG/ML
1 INJECTION INTRACARDIAC; INTRAVENOUS
Status: DISCONTINUED | OUTPATIENT
Start: 2021-06-01 | End: 2021-06-01 | Stop reason: HOSPADM

## 2021-06-01 RX ORDER — SODIUM CHLORIDE 9 MG/ML
INJECTION, SOLUTION INTRAVENOUS
Status: DISCONTINUED | OUTPATIENT
Start: 2021-06-01 | End: 2021-06-01 | Stop reason: HOSPADM

## 2021-06-01 RX ADMIN — PROPOFOL 30 MG: 10 INJECTION, EMULSION INTRAVENOUS at 09:03

## 2021-06-01 RX ADMIN — PROPOFOL 40 MG: 10 INJECTION, EMULSION INTRAVENOUS at 09:13

## 2021-06-01 RX ADMIN — PROPOFOL 50 MG: 10 INJECTION, EMULSION INTRAVENOUS at 08:54

## 2021-06-01 RX ADMIN — PROPOFOL 50 MG: 10 INJECTION, EMULSION INTRAVENOUS at 08:58

## 2021-06-01 RX ADMIN — LIDOCAINE HYDROCHLORIDE 100 MG: 20 INJECTION, SOLUTION EPIDURAL; INFILTRATION; INTRACAUDAL; PERINEURAL at 08:49

## 2021-06-01 RX ADMIN — PROPOFOL 75 MG: 10 INJECTION, EMULSION INTRAVENOUS at 08:49

## 2021-06-01 RX ADMIN — PROPOFOL 75 MG: 10 INJECTION, EMULSION INTRAVENOUS at 08:51

## 2021-06-01 RX ADMIN — PROPOFOL 30 MG: 10 INJECTION, EMULSION INTRAVENOUS at 09:26

## 2021-06-01 RX ADMIN — SODIUM CHLORIDE: 900 INJECTION, SOLUTION INTRAVENOUS at 08:38

## 2021-06-01 RX ADMIN — PROPOFOL 40 MG: 10 INJECTION, EMULSION INTRAVENOUS at 09:18

## 2021-06-01 RX ADMIN — PROPOFOL 50 MG: 10 INJECTION, EMULSION INTRAVENOUS at 08:56

## 2021-06-01 RX ADMIN — PROPOFOL 30 MG: 10 INJECTION, EMULSION INTRAVENOUS at 09:08

## 2021-06-01 NOTE — DISCHARGE INSTRUCTIONS
Larisa Hollingsworth 916 Raven Colmenares M.D.  174 [facility], [address] [place] [address] [address]kw[address]  [phone]                      EGD/COLONOSCOPY 1600 [facility]  [id number]  1945    DISCOMFORT:  Redness at IV site- apply warm compress to area; if redness or soreness persist- contact your physician  There may be a slight amount of blood passed from the rectum  Gaseous discomfort- walking, belching will help relieve any discomfort  You may not operate a vehicle for 12 hours  You may not  engage in an occupation involving machinery or appliances for rest of today  You may not  drink alcoholic beverages for at least 12 hours  Avoid making any critical decisions for at least 24 hour    DIET:   You may resume your normal diet, but some patients find that heavy or large  meals may lead to indigestion or vomiting. I suggest a light meal as first food  Intake. I recommend a whole food, plant-based diet for your overall health. ACTIVITY:  You may resume your normal daily activities. It is recommended that you spend the remainder of the day resting - avoid any strenuous activity. CALL M.D. ANY SIGN OF   Increasing pain, nausea, vomiting  Abdominal distension (swelling)  New increased bleeding (oral or rectal)  Fever (chills)  Pain in chest area  Bloody discharge from nose or mouth  Shortness of breath    Additional Instructions:   Call Dr. Raven Colmenares if any questions or problems at [phone]   You should receive the biopsy results by phone or mail within 3 weeks, if not, call my office for the results. Should have a repeat colonoscopy in 1 year due to inadequate bowel prep with different bowel prep. EGD showed yeast infection of the esophagus and mild stomach redness. My assistant will call in diflucan rx for yeast in the esophagus. Colonoscopy showed inadequate bowel prep. Small polyp removed. No mass.           Learning About Coronavirus [phone])  Coronavirus [phone]): Overview  What is coronavirus ([id number])? The coronavirus disease (COVID-19) is caused by a virus. It is an illness that was first found in [place], [place], in December 2019. It has since spread worldwide. The virus can cause fever, cough, and trouble breathing. In severe cases, it can cause pneumonia and make it hard to breathe without help. It can cause death. Coronaviruses are a large group of viruses. They cause the common cold. They also cause more serious illnesses like Middle East respiratory syndrome (MERS) and severe acute respiratory syndrome (SARS). COVID-19 is caused by a novel coronavirus. That means it's a new type that has not been seen in people before. This virus spreads person-to-person through droplets from coughing and sneezing. It can also spread when you are close to someone who is infected. And it can spread when you touch something that has the virus on it, such as a doorknob or a tabletop. What can you do to protect yourself from coronavirus (COVID-19)? The best way to protect yourself from getting sick is to:  · Avoid areas where there is an outbreak. · Avoid contact with people who may be infected. · Wash your hands often with soap or alcohol-based hand sanitizers. · Avoid crowds and try to stay at least 6 feet away from other people. · Wash your hands often, especially after you cough or sneeze. Use soap and water, and scrub for at least 20 seconds. If soap and water aren't available, use an alcohol-based hand . · Avoid touching your mouth, nose, and eyes. What can you do to avoid spreading the virus to others? To help avoid spreading the virus to others:  · Cover your mouth with a tissue when you cough or sneeze. Then throw the tissue in the trash. · Use a disinfectant to clean things that you touch often. · Stay home if you are sick or have been exposed to the virus. Don't go to school, work, or public areas. And don't use public transportation.   · If you are sick:  ? Leave your home only if you need to get medical care. But call the doctor's office first so they know you're coming. And wear a face mask, if you have one.  ? If you have a face mask, wear it whenever you're around other people. It can help stop the spread of the virus when you cough or sneeze. ? Clean and disinfect your home every day. Use household  and disinfectant wipes or sprays. Take special care to clean things that you grab with your hands. These include doorknobs, remote controls, phones, and handles on your refrigerator and microwave. And don't forget countertops, tabletops, bathrooms, and computer keyboards. When to call for help  Call 911 anytime you think you may need emergency care. For example, call if:  · You have severe trouble breathing. (You can't talk at all.)  · You have constant chest pain or pressure. · You are severely dizzy or lightheaded. · You are confused or can't think clearly. · Your face and lips have a blue color. · You pass out (lose consciousness) or are very hard to wake up. Call your doctor now if you develop symptoms such as:  · Shortness of breath. · Fever. · Cough. If you need to get care, call ahead to the doctor's office for instructions before you go. Make sure you wear a face mask, if you have one, to prevent exposing other people to the virus. Where can you get the latest information? The following health organizations are tracking and studying this virus. Their websites contain the most up-to-date information. Lucky [place]ans also learn what to do if you think you may have been exposed to the virus. · U.S. Centers for Disease Control and Prevention (CDC): The CDC provides updated news about the disease and travel advice. The website also tells you how to prevent the spread of infection. www.cdc.gov  · World Health Organization [facility]): WHO offers information about the virus outbreaks.  WHO also has travel advice. www.who.int  Current as of: April 1, 2020 Content Version: 12.4  © 5743-6216 Healthwise, Incorporated. Care instructions adapted under license by your healthcare professional. If you have questions about a medical condition or this instruction, always ask your healthcare professional. [address] any warranty or liability for your use of this information.

## 2021-06-01 NOTE — PROCEDURES
[facility] 912 Jabari Locke M.D.  [address] [place] [address]  [phone]               Colonoscopy Procedure Note    NAME: Mckayla Do  :  [birth year]  MRN:  [id number]    Indications:   Screening colonoscopy     : Wayne Harris MD    Referring Provider:  Alisia Croft MD    Staff: Endoscopy Denice Sailors: Jared Grissom  Endoscopy RN-1: Frantz Pineda RN    Prosthetic devices, grafts, tissues, transplant, or devices implanted: none    Medicines:  MAC anesthesia      Procedure Details:  After informed consent was obtained with all risks and benefits of the procedure explained and preprocedure exam completed, the patient was placed in the left lateral decubitus position. Universal protocol for patient identification was performed and documented in the nursing notes. Throughout the procedure, the patient's blood pressure was monitored at least every five minutes; pulse, and oxygen saturations were monitored continuously. All vital signs were documented in the nursing notes. A digital rectal exam was performed and was normal.  The Olympus videocolonoscope  was inserted in the rectum and carefully advanced to the cecum, which was identified by the ileocecal valve and appendiceal orifice. The colonoscope was slowly withdrawn with careful evaluation between folds. Retroflexion in the rectum was performed; findings and interventions are described below. Procedure start time, extent reached time/cecum time, and procedure end time are documented in the nursing notes. The quality of preparation was inadequate.        Findings:   1. 4 mm sessile polyp in the cecum s/p cold snare polypectomy    Interventions:    1 complete polypectomy were performed using cold snare  and the polyps were  retrieved    Specimens:   ID Type Source Tests Collected by Time Destination   1 : Biopsies Rule Out H. Pylori Preservative Gastric  Sarita Pires MD 2021 5823 Pathology   2 : Biopsies Rule Out Eosinophlic Esophagitis Preservative Esophagus, Distal  Rubi Squires MD 6/1/2021 4040 Pathology   3 : Biopsies Rule Out Candida and Eosinophlic Esophagitis Preservative Esophagus, Proximal  Rubi Squires MD 6/1/2021 4877 Pathology       EBL:  None. Complications:   No immediate complications     Impression:  -See post-procedure diagnoses. No mass seen. Recommendations:   -Repeat colonoscopy in 1 year due to inadequate bowel prep. Will do different bowel prep. If < 10 years, reason:  inadequate prep    Resume normal medication(s). Signed by:  Lori Shreiff MD          6/1/2021  9:37 AM

## 2021-06-01 NOTE — ANESTHESIA POSTPROCEDURE EVALUATION
Post-Anesthesia Evaluation and Assessment    Patient: [place] Martha MRN: [id number]  SSN: [id number]    YOB: 1945  Age: 77 y.o. Sex: male      I have evaluated the patient and they are stable and ready for discharge from the PACU. Cardiovascular Function/Vital Signs  Visit Vitals  BP (!) 161/80   Pulse (!) 51   Temp 37.1 °C (98.7 °F)   Resp 12   Ht 6' 1\" (1.854 m)   Wt 100.7 kg (222 lb)   SpO2 96%   BMI 29.29 kg/m²       Patient is status post MAC anesthesia for Procedure(s):  COLONOSCOPY  :-  ESOPHAGOGASTRODUODENOSCOPY (EGD)  ESOPHAGOGASTRODUODENAL (EGD) BIOPSY  ENDOSCOPIC POLYPECTOMY. Nausea/Vomiting: None    Postoperative hydration reviewed and adequate. Pain:  Pain Scale 1: Numeric (0 - 10) (06/01/21 0950)  Pain Intensity 1: 0 (06/01/21 0950)   Managed    Neurological Status: At baseline    Mental Status, Level of Consciousness: Alert and  oriented to person, place, and time    Pulmonary Status:   O2 Device: None (Room air) (06/01/21 0950)   Adequate oxygenation and airway patent    Complications related to anesthesia: None    Post-anesthesia assessment completed. No concerns    Signed By: Lana Lane MD     June 1, 2021              Post-Anesthesia Evaluation and Assessment    Patient: Andrew Martha MRN: [id number]  SSN: [id number]    YOB: 1945  Age: 77 y.o. Sex: male      I have evaluated the patient and they are stable and ready for discharge from the PACU. Cardiovascular Function/Vital Signs  Visit Vitals  BP (!) 161/80   Pulse (!) 51   Temp 37.1 °C (98.7 °F)   Resp 12   Ht 6' 1\" (1.854 m)   Wt 100.7 kg (222 lb)   SpO2 96%   BMI 29.29 kg/m²       Patient is status post MAC anesthesia for Procedure(s):  COLONOSCOPY  :-  ESOPHAGOGASTRODUODENOSCOPY (EGD)  ESOPHAGOGASTRODUODENAL (EGD) BIOPSY  ENDOSCOPIC POLYPECTOMY. Nausea/Vomiting: None    Postoperative hydration reviewed and adequate.     Pain:  Pain Scale 1: Numeric (0 - 10) (06/01/21 0950)  Pain Intensity 1: 0 (06/01/21 0950)   Managed    Neurological Status: At baseline    Mental Status, Level of Consciousness: Alert and  oriented to person, place, and time    Pulmonary Status:   O2 Device: None (Room air) (06/01/21 0950)   Adequate oxygenation and airway patent    Complications related to anesthesia: None    Post-anesthesia assessment completed. No concerns    Signed By: Tara Peterson MD     June 1, 2021              Procedure(s):  COLONOSCOPY  :-  ESOPHAGOGASTRODUODENOSCOPY (EGD)  ESOPHAGOGASTRODUODENAL (EGD) BIOPSY  ENDOSCOPIC POLYPECTOMY. MAC    <BSHSIANPOST>    INITIAL Post-op Vital signs:   Vitals Value Taken Time   /80 06/01/21 0950   Temp 37.1 °C (98.7 °F) 06/01/21 0938   Pulse 53 06/01/21 0951   Resp 16 06/01/21 0951   SpO2 96 % 06/01/21 0950   Vitals shown include unvalidated device data.

## 2021-06-01 NOTE — PROGRESS NOTES
[facility]  1945  [id number]    Situation:  Verbal report received from: Jennifer Nick  Procedure: Procedure(s):  COLONOSCOPY  :-  ESOPHAGOGASTRODUODENOSCOPY (EGD)  ESOPHAGOGASTRODUODENAL (EGD) BIOPSY  ENDOSCOPIC POLYPECTOMY    Background:    Preoperative diagnosis: Screening for colon cancer [Z12.11]  Dysphagia, unspecified type [R13.10]  Postoperative diagnosis: 1. Candidal Esophagitis  2. Gastritis  3. Colon Polyp    :  Dr. Ondina Mckinley  Assistant(s): Endoscopy Technician-1: Augustus ALDANA  Endoscopy RN-1: Josefa Barrera RN    Specimens:   ID Type Source Tests Collected by Time Destination   1 : Biopsies Rule Out H. Pylori Preservative Gastric  Lucia Enriquez MD 6/1/2021 6718 Pathology   2 : Biopsies Rule Out Eosinophlic Esophagitis Preservative Esophagus, Distal  Lucia Enriquez MD 6/1/2021 5228 Pathology   3 : Biopsies Rule Out Candida and Eosinophlic Esophagitis Preservative Esophagus, Proximal  Lucia Enriquez MD 6/1/2021 0857 Pathology   4 : Cecal Polyp Preservative   Lucia Enriquez MD 6/1/2021 1951 Pathology     H. Pylori  no    Assessment:      Anesthesia gave intra-procedure sedation and medications, see anesthesia flow sheet yes    Intravenous fluids: NS@ KVO     Vital signs stable     Abdominal assessment: round and soft     Recommendation:  Discharge patient per MD order.     Family   Permission to share finding with family or friend yes

## 2021-06-01 NOTE — H&P
[address], [address] [place] [address]          Pre-procedure History and Physical       NAME:  Raine Candelaria   :   [birth year]   MRN:   [id number]     CHIEF COMPLAINT/HPI: crcs, dysphagia    PMH:  Past Medical History:   Diagnosis Date    Carcinoma in situ of prostate     Chronic pain     RIGHT KNEE    Diabetes mellitus with coincident hypertension ([address] 2018    NIDDM    Elevated prostate specific antigen (PSA)     Hypertension     Hypertrophy of prostate with urinary obstruction and other lower urinary tract symptoms (LUTS)     [place]    Low back pain     Peripheral neuropathy     Primary osteoarthritis of right knee 2018    Retention of urine, unspecified        PSH:  Past Surgical History:   Procedure Laterality Date    ABDOMEN SURGERY PROC UNLISTED  6930    UMBILICAL HERNIA REPAIR    HX BACK SURGERY  1984    LUMBAR FUSION    HX CHOLECYSTECTOMY  2006    HX COLONOSCOPY      2007 nl    HX LUMBAR LAMINECTOMY         Allergies:  No Known Allergies    Home Medications:  Prior to Admission Medications   Prescriptions Last Dose Informant Patient Reported? Taking?   acetaminophen (TYLENOL) 500 mg tablet Unknown at Unknown time  No No   Sig: Take 1 Tab by mouth every four (4) hours (while awake). finasteride (PROSCAR) 5 mg tablet 2021  Yes No   Sig: Take 5 mg by mouth daily. ibuprofen (MOTRIN) 600 mg tablet Unknown at Unknown time  No No   Sig: Take 1 Tab by mouth every eight (8) hours as needed for Pain.    lisinopril-hydroCHLOROthiazide (PRINZIDE, ZESTORETIC) 20-25 mg per tablet 2021  No No   Sig: TAKE 1 TABLET BY MOUTH EVERY DAY   metFORMIN (GLUCOPHAGE) 1,000 mg tablet 2021  No No   Sig: TAKE 1 TABLET BY MOUTH TWICE A DAY WITH MEALS      Facility-Administered Medications: None       Hospital Medications:  Current Facility-Administered Medications   Medication Dose Route Frequency    0.9% sodium chloride infusion  150 mL/hr IntraVENous CONTINUOUS    sodium chloride (NS) flush 5-40 mL  5-40 mL IntraVENous Q8H    sodium chloride (NS) flush 5-40 mL  5-40 mL IntraVENous PRN    midazolam (VERSED) injection 0.25-5 mg  0.25-5 mg IntraVENous Multiple    fentaNYL citrate (PF) injection 200 mcg  200 mcg IntraVENous Multiple    simethicone (MYLICON) 93UG/5.6EH oral drops 80 mg  1.2 mL Oral Multiple    atropine injection 0.5 mg  0.5 mg IntraVENous ONCE PRN    EPINEPHrine (ADRENALIN) 0.1 mg/mL syringe 1 mg  1 mg Endoscopically ONCE PRN     Facility-Administered Medications Ordered in Other Encounters   Medication Dose Route Frequency    0.9% sodium chloride infusion   IntraVENous CONTINUOUS       Family History:  Family History   Problem Relation Age of Onset    Heart Disease Father     Other Mother         BRAIN ANEURYSM    Anesth Problems Neg Hx        Social History:  Social History     Tobacco Use    Smoking status: Never Smoker    Smokeless tobacco: Never Used   Substance Use Topics    Alcohol use: No       The patient was counseled at length about the risks of flaco Covid-19 in the aleja-operative and post-operative states including the recovery window of their procedure. The patient was made aware that flaco Covid-19 after a surgical procedure may worsen their prognosis for recovering from the virus and lend to a higher morbidity and or mortality risk. The patient was given the options of postponing their procedure. All of the risks, benefits, and alternatives were discussed. The patient does  wish to proceed with the procedure. PHYSICAL EXAM PRIOR TO SEDATION:  General: Alert, in no acute distress    Lungs:            CTA bilaterally  Heart:  Normal S1, S2    Abdomen: Soft, Non distended, Non tender. Normoactive bowel sounds. Assessment:   Stable for sedation administration.   Date of last colonoscopy: 12 yrs, Polyps  No    Plan:     · Endoscopic procedure with sedation     Signed By: Cesar Millan MD     6/1/2021 8:48 AM

## 2021-06-01 NOTE — PROCEDURES
[facility]  Vladimir Ho M.D.  174 [facility], [address] [place] [address]  [phone]               Esophagogastroduodenoscopy (EGD) Procedure Note    NAME: Sanjuanita Nunez  :  [birth year]  MRN:  [id number]    Indications:  Oropharyngeal dysphagia     : Coco Joseph MD    Referring Provider:  Giana Ye MD    Staff: Endoscopy Octavio Danielito: Jo Paniagua  Endoscopy RN-1: Arnaldo Moran RN    Prosthetic devices, grafts, tissues, transplant, or devices implanted: none    Medicine:  MAC anesthesia      Procedure Details:  After informed consent was obtained with all risks and benefits of the procedure explained and preprocedure exam completed, the patient was placed in the left lateral decubitus position. Universal protocol for patient identification was performed and documented in the nursing notes. Throughout the procedure, the patient's blood pressure was monitored at least every five minutes; pulse, and oxygen saturations were monitored continuously. All vital signs were documented in the nursing notes. The endoscope was inserted into the mouth and advanced under direct vision to second portion of the duodenum. A careful inspection was made as the gastroscope was withdrawn, including a retroflexed view of the proximal stomach; findings and interventions are described below.       Findings:   Esophagus: moderate white spots throughout the upper and middle esophagus s/p biopsies  Stomach: mild patchy erythema in the antrum s/p biopsies throughout the stomach  Duodenum: normal    Interventions:    biopsy of esophagus and stomach    Specimens:   ID Type Source Tests Collected by Time Destination   1 : Biopsies Rule Out H. Pylori Preservative Gastric  Sunil Erickson MD 2021 9799 Pathology   2 : Biopsies Rule Out Eosinophlic Esophagitis Preservative Esophagus, Distal  Sunil Erickson MD 2021 9151 Pathology   3 : Biopsies Rule Out Candida and Eosinophlic Esophagitis Preservative Esophagus, Proximal  Xiang Michel MD 6/1/2021 9626 Pathology        EBL: None          Complications:     No immediate complications        Impression:  -See post-procedure diagnoses. Recommendations:  -Await pathology.  -Diflucan    Signed by:  Tan Connor MD         6/1/2021 9:33 AM

## 2021-06-01 NOTE — ANESTHESIA PREPROCEDURE EVALUATION
Relevant Problems   No relevant active problems       Anesthetic History   No history of anesthetic complications            Review of Systems / Medical History  Patient summary reviewed, nursing notes reviewed and pertinent labs reviewed    Pulmonary  Within defined limits                 Neuro/Psych   Within defined limits           Cardiovascular    Hypertension: well controlled              Exercise tolerance: >4 METS     GI/Hepatic/Renal                Endo/Other    Diabetes    Arthritis     Other Findings              Physical Exam    Airway  Mallampati: I  TM Distance: 4 - 6 cm  Neck ROM: normal range of motion   Mouth opening: Normal     Cardiovascular    Rhythm: regular  Rate: normal         Dental  No notable dental hx       Pulmonary  Breath sounds clear to auscultation               Abdominal         Other Findings            Anesthetic Plan    ASA: 3  Anesthesia type: MAC          Induction: Intravenous  Anesthetic plan and risks discussed with: Patient

## 2023-01-19 ENCOUNTER — ANESTHESIA (OUTPATIENT)
Dept: ENDOSCOPY | Age: 78
End: 2023-01-19
Payer: MEDICARE

## 2023-01-19 ENCOUNTER — HOSPITAL ENCOUNTER (OUTPATIENT)
Age: 78
Setting detail: OUTPATIENT SURGERY
Discharge: HOME | End: 2023-01-19
Attending: INTERNAL MEDICINE | Admitting: INTERNAL MEDICINE
Payer: MEDICARE

## 2023-01-19 ENCOUNTER — ANESTHESIA EVENT (OUTPATIENT)
Dept: ENDOSCOPY | Age: 78
End: 2023-01-19
Payer: MEDICARE

## 2023-01-19 VITALS
WEIGHT: 224.7 LBS | RESPIRATION RATE: 15 BRPM | HEART RATE: 60 BPM | BODY MASS INDEX: 29.78 KG/M2 | HEIGHT: 73 IN | TEMPERATURE: 97.3 F | SYSTOLIC BLOOD PRESSURE: 159 MMHG | OXYGEN SATURATION: 95 % | DIASTOLIC BLOOD PRESSURE: 80 MMHG

## 2023-01-19 PROCEDURE — 76060000032 HC ANESTHESIA 0.5 TO 1 HR: Performed by: INTERNAL MEDICINE

## 2023-01-19 PROCEDURE — 2709999900 HC NON-CHARGEABLE SUPPLY: Performed by: INTERNAL MEDICINE

## 2023-01-19 PROCEDURE — 76040000007: Performed by: INTERNAL MEDICINE

## 2023-01-19 PROCEDURE — 77030038604 HC SNR ENDO EXACTO USEN -B: Performed by: INTERNAL MEDICINE

## 2023-01-19 PROCEDURE — 74011250636 HC RX REV CODE- 250/636: Performed by: NURSE PRACTITIONER

## 2023-01-19 PROCEDURE — 88305 TISSUE EXAM BY PATHOLOGIST: CPT

## 2023-01-19 RX ORDER — SODIUM CHLORIDE 0.9 % (FLUSH) 0.9 %
5-40 SYRINGE (ML) INJECTION EVERY 8 HOURS
Status: DISCONTINUED | OUTPATIENT
Start: 2023-01-19 | End: 2023-01-19 | Stop reason: HOSPADM

## 2023-01-19 RX ORDER — PROPOFOL 10 MG/ML
INJECTION, EMULSION INTRAVENOUS AS NEEDED
Status: DISCONTINUED | OUTPATIENT
Start: 2023-01-19 | End: 2023-01-19 | Stop reason: HOSPADM

## 2023-01-19 RX ORDER — EPINEPHRINE 0.1 MG/ML
1 INJECTION INTRACARDIAC; INTRAVENOUS
Status: DISCONTINUED | OUTPATIENT
Start: 2023-01-19 | End: 2023-01-19 | Stop reason: HOSPADM

## 2023-01-19 RX ORDER — SODIUM CHLORIDE, SODIUM LACTATE, POTASSIUM CHLORIDE, CALCIUM CHLORIDE 600; 310; 30; 20 MG/100ML; MG/100ML; MG/100ML; MG/100ML
INJECTION, SOLUTION INTRAVENOUS
Status: DISCONTINUED | OUTPATIENT
Start: 2023-01-19 | End: 2023-01-19 | Stop reason: HOSPADM

## 2023-01-19 RX ORDER — PHENYLEPHRINE HCL IN 0.9% NACL 0.4MG/10ML
SYRINGE (ML) INTRAVENOUS AS NEEDED
Status: DISCONTINUED | OUTPATIENT
Start: 2023-01-19 | End: 2023-01-19

## 2023-01-19 RX ORDER — DEXTROMETHORPHAN/PSEUDOEPHED 2.5-7.5/.8
1.2 DROPS ORAL
Status: DISCONTINUED | OUTPATIENT
Start: 2023-01-19 | End: 2023-01-19 | Stop reason: HOSPADM

## 2023-01-19 RX ORDER — ATROPINE SULFATE 0.1 MG/ML
0.5 INJECTION INTRAVENOUS
Status: DISCONTINUED | OUTPATIENT
Start: 2023-01-19 | End: 2023-01-19 | Stop reason: HOSPADM

## 2023-01-19 RX ORDER — SODIUM CHLORIDE 0.9 % (FLUSH) 0.9 %
5-40 SYRINGE (ML) INJECTION AS NEEDED
Status: DISCONTINUED | OUTPATIENT
Start: 2023-01-19 | End: 2023-01-19 | Stop reason: HOSPADM

## 2023-01-19 RX ORDER — MIDAZOLAM HYDROCHLORIDE 1 MG/ML
.25-5 INJECTION, SOLUTION INTRAMUSCULAR; INTRAVENOUS
Status: DISCONTINUED | OUTPATIENT
Start: 2023-01-19 | End: 2023-01-19 | Stop reason: HOSPADM

## 2023-01-19 RX ORDER — FENTANYL CITRATE 50 UG/ML
200 INJECTION, SOLUTION INTRAMUSCULAR; INTRAVENOUS
Status: DISCONTINUED | OUTPATIENT
Start: 2023-01-19 | End: 2023-01-19 | Stop reason: HOSPADM

## 2023-01-19 RX ORDER — SODIUM CHLORIDE 9 MG/ML
150 INJECTION, SOLUTION INTRAVENOUS CONTINUOUS
Status: DISCONTINUED | OUTPATIENT
Start: 2023-01-19 | End: 2023-01-19 | Stop reason: HOSPADM

## 2023-01-19 RX ADMIN — PROPOFOL 30 MG: 10 INJECTION, EMULSION INTRAVENOUS at 09:35

## 2023-01-19 RX ADMIN — PROPOFOL 30 MG: 10 INJECTION, EMULSION INTRAVENOUS at 09:37

## 2023-01-19 RX ADMIN — SODIUM CHLORIDE, SODIUM LACTATE, POTASSIUM CHLORIDE, AND CALCIUM CHLORIDE: 600; 310; 30; 20 INJECTION, SOLUTION INTRAVENOUS at 09:30

## 2023-01-19 RX ADMIN — PROPOFOL 30 MG: 10 INJECTION, EMULSION INTRAVENOUS at 09:49

## 2023-01-19 RX ADMIN — PROPOFOL 70 MG: 10 INJECTION, EMULSION INTRAVENOUS at 09:30

## 2023-01-19 RX ADMIN — PROPOFOL 30 MG: 10 INJECTION, EMULSION INTRAVENOUS at 09:33

## 2023-01-19 RX ADMIN — PROPOFOL 30 MG: 10 INJECTION, EMULSION INTRAVENOUS at 09:43

## 2023-01-19 RX ADMIN — PROPOFOL 30 MG: 10 INJECTION, EMULSION INTRAVENOUS at 09:40

## 2023-01-19 RX ADMIN — PROPOFOL 30 MG: 10 INJECTION, EMULSION INTRAVENOUS at 09:46

## 2023-01-19 NOTE — PROCEDURES
[facility]isa Dorantes Jul 912 Nathalie Carrillo M.D.  [address] [place] [address] [place]   [phone]               Colonoscopy Procedure Note    NAME: Kinga Whitney  :    MRN:  [id number]    Indications:   Screening colonoscopy     : Eze Ramirez MD    Referring Provider:  Lee Ann Pierre MD    Staff: Endoscopy RN-1: Ruthie Cormier RN  Endoscopy RN-2: Farrah العراقي RN    Prosthetic devices, grafts, tissues, transplant, or devices implanted: none    Medicines:  MAC anesthesia      Procedure Details:  After informed consent was obtained with all risks and benefits of the procedure explained and preprocedure exam completed, the patient was placed in the left lateral decubitus position. Universal protocol for patient identification was performed and documented in the nursing notes. Throughout the procedure, the patient's blood pressure was monitored at least every five minutes; pulse, and oxygen saturations were monitored continuously. All vital signs were documented in the nursing notes. A digital rectal exam was performed and was normal.  The Olympus videocolonoscope  was inserted in the rectum and carefully advanced to the cecum, which was identified by the ileocecal valve and appendiceal orifice. The colonoscope was slowly withdrawn with careful evaluation between folds. Retroflexion in the rectum was performed; findings and interventions are described below. Procedure start time, extent reached time/cecum time, and procedure end time are documented in the nursing notes. The quality of preparation was adequate.        Findings:   3 sessile polyps with greatest diameter of 4 mm (1 in the transverse, 2 in the rectum) s/p cold snare polypectomy  Mild sigmoid diverticulosis  Moderate internal hemorrhoids    Interventions:    3 complete polypectomy were performed using cold snare  and the polyps were  retrieved    Specimens:   ID Type Source Tests Collected by Time Destination   1 : Transverse colon polyp Preservative Colon, Transverse  Ana Lee MD 1/19/2023 0331 Pathology       EBL:  None. Complications:   No immediate complications     Impression:  -See post-procedure diagnoses. Recommendations:   -If adenoma is present, repeat colonoscopy in 3-7 years based on pathology with 2 day bowel prep. If < 10 years, reason:  above average risk patient    Resume normal medication(s). Signed by:  Dwayne Valle MD          1/19/2023  9:59 AM

## 2023-01-19 NOTE — PROGRESS NOTES
Initial RN admission and assessment performed and documented in Endoscopy navigator. Patient evaluated by anesthesia in pre-procedure holding. All procedural vital signs, airway assessment, and level of consciousness information monitored and recorded by anesthesia staff on the anesthesia record. Report received from CRNA post procedure. Patient transported to recovery area by RN. Endoscope was pre-cleaned at bedside immediately following procedure by Yordan Jung.
Removed prostate cancer from his medical history. Patient on finasteride, denies prostate cancer and upon review of chart no notes mention prostate cancer, had elevated PSA no prostate surgery or radiation or chemo.
Report from Issa Vasquez [place] [address]
Sophie Caal  (RN)  2020 12:24:31

## 2023-01-19 NOTE — H&P
[address], [address] [place] [address]          Pre-procedure History and Physical       NAME:  Shital Lindsay   :      MRN:   [id number]     CHIEF COMPLAINT/HPI: crcs    PMH:  Past Medical History:   Diagnosis Date    Carcinoma in situ of prostate     23 patient denies this    Diabetes mellitus with coincident hypertension ([address] 2018    NIDDM    Elevated prostate specific antigen (PSA)     Hypertension     Hypertrophy of prostate with urinary obstruction and other lower urinary tract symptoms (LUTS)     [place]    Low back pain     Peripheral neuropathy     Primary osteoarthritis of right knee 2018    s/p knee replacement in 2019    Retention of urine, unspecified        PSH:  Past Surgical History:   Procedure Laterality Date    COLONOSCOPY N/A 2021    COLONOSCOPY  :- performed by Ming Kothari MD at [facility]    HX CHOLECYSTECTOMY  2006    HX COLONOSCOPY      2007 nl    HX KNEE REPLACEMENT Right 2019    ME UNLISTED PROCEDURE ABDOMEN PERITONEUM & OMENTUM  7610    UMBILICAL HERNIA REPAIR       Allergies:  No Known Allergies    Home Medications:  Prior to Admission Medications   Prescriptions Last Dose Informant Patient Reported? Taking?   acetaminophen (TYLENOL) 500 mg tablet 2023  No Yes   Sig: Take 1 Tab by mouth every four (4) hours (while awake). finasteride (PROSCAR) 5 mg tablet 2023  Yes Yes   Sig: Take 5 mg by mouth daily. glimepiride (AMARYL) 2 mg tablet 2023  No Yes   Sig: TAKE 1 TABLET BY MOUTH EVERY DAY IN THE MORNING   ibuprofen (MOTRIN) 600 mg tablet 2022  No Yes   Sig: Take 1 Tab by mouth every eight (8) hours as needed for Pain.    lisinopril-hydroCHLOROthiazide (PRINZIDE, ZESTORETIC) 20-25 mg per tablet 2023  No Yes   Sig: TAKE 1 TABLET BY MOUTH EVERY DAY   metFORMIN (GLUCOPHAGE) 1,000 mg tablet 2023  No Yes   Sig: TAKE 1 TABLET BY MOUTH TWICE A DAY WITH MEALS      Facility-Administered Medications: None       Hospital Medications:  Current Facility-Administered Medications   Medication Dose Route Frequency    0.9% sodium chloride infusion  150 mL/hr IntraVENous CONTINUOUS    sodium chloride (NS) flush 5-40 mL  5-40 mL IntraVENous Q8H    sodium chloride (NS) flush 5-40 mL  5-40 mL IntraVENous PRN    midazolam (VERSED) injection 0.25-5 mg  0.25-5 mg IntraVENous Multiple    fentaNYL citrate (PF) injection 200 mcg  200 mcg IntraVENous Multiple    simethicone (MYLICON) 82YF/1.3VN oral drops 80 mg  1.2 mL Oral Multiple    atropine injection 0.5 mg  0.5 mg IntraVENous ONCE PRN    EPINEPHrine (ADRENALIN) 0.1 mg/mL syringe 1 mg  1 mg Endoscopically ONCE PRN       Family History:  Family History   Problem Relation Age of Onset    Heart Disease Father     Other Mother         BRAIN ANEURYSM    Anesth Problems Neg Hx        Social History:  Social History     Tobacco Use    Smoking status: Never    Smokeless tobacco: Never   Substance Use Topics    Alcohol use: No       The patient was counseled at length about the risks of flaco Covid-19 in the aleja-operative and post-operative states including the recovery window of their procedure. The patient was made aware that flaco Covid-19 after a surgical procedure may worsen their prognosis for recovering from the virus and lend to a higher morbidity and or mortality risk. The patient was given the options of postponing their procedure. All of the risks, benefits, and alternatives were discussed. The patient does  wish to proceed with the procedure. PHYSICAL EXAM PRIOR TO SEDATION:  General: Alert, in no acute distress    Lungs:            CTA bilaterally  Heart:  Normal S1, S2    Abdomen: Soft, Non distended, Non tender. Normoactive bowel sounds. Assessment:   Stable for sedation administration.   If this colonoscopy is less than 3 years from the previous colonoscopy, reason:  last colonoscopy inadequate prep    Plan:     Endoscopic procedure with sedation     Signed By: Reilly Jade MD     1/19/2023  9:27 AM

## 2023-01-19 NOTE — ANESTHESIA POSTPROCEDURE EVALUATION
Procedure(s):  COLONOSCOPY  ENDOSCOPIC POLYPECTOMY. MAC    Anesthesia Post Evaluation        Patient location during evaluation: PACU  Patient participation: complete - patient participated  Level of consciousness: awake and alert  Pain management: adequate  Airway patency: patent  Anesthetic complications: no  Cardiovascular status: acceptable  Respiratory status: acceptable  Hydration status: acceptable  Comments: I have seen and evaluated the patient and is ready for discharge. Janes Patel MD    Post anesthesia nausea and vomiting:  none      INITIAL Post-op Vital signs:   Vitals Value Taken Time   /80 01/19/23 1020   Temp     Pulse 62 01/19/23 1021   Resp 17 01/19/23 1021   SpO2 95 % 01/19/23 1021   Vitals shown include unvalidated device data.

## 2023-01-19 NOTE — DISCHARGE INSTRUCTIONS
Larisa Santoyo [address] M.D.  [place], [address]  [phone]          COLONOSCOPY [facility]  [id number]  1945    DISCOMFORT:  Redness at IV site- apply warm compress to area; if redness or soreness persist- contact your physician  There may be a slight amount of blood passed from the rectum  Gaseous discomfort- walking, belching will help relieve any discomfort  You may not operate a vehicle for 12 hours  You may not engage in an occupation involving machinery or appliances for the  rest of today  You may not drink alcoholic beverages for at least 12 hours  Avoid making any critical decisions for at least 24 hours    DIET:   You may resume your normal diet, but some patients find that heavy or large  meals may lead to indigestion or vomiting. I suggest a light meal as first food  intake. I recommend a whole food, plant-based diet for your overall health. ACTIVITY:  You may resume your normal daily activities. It is recommended that you spend the remainder of the day resting - avoid any strenuous activity. CALL M.D. IF ANY SIGN OF:   Increasing pain, nausea, vomiting  Abdominal distension (swelling)  Significant bleeding (oral or rectal)  Fever   Pain in chest area  Shortness of breath    Additional Instructions:   Call Dr. Ryann Aly if any questions or problems at [phone]   You should receive the biopsy results by phone or mail within 3 weeks, if not, call  my office for the results      Should have a repeat colonoscopy in 3-7 years if adenomatous polyp with 2 day bowel prep. Colonoscopy showed 3 polyps removed, diverticulosis, and internal hemorrhoids.

## (undated) DEVICE — TRAP SURG QUAD PARABOLA SLOT DSGN SFTY SCRN TRAPEASE

## (undated) DEVICE — PADDING CAST SPEC 6INX4YD COT --

## (undated) DEVICE — PADDING CST 6IN STERILE --

## (undated) DEVICE — Device

## (undated) DEVICE — BLADE SAW W083XL354IN THK0047IN CUT THK0047IN SAG FLR

## (undated) DEVICE — SYR MED LL 20ML LF STRL DISP

## (undated) DEVICE — GOWN,PREVENTION PLUS,XLN/2XL,ST,22/CS: Brand: MEDLINE

## (undated) DEVICE — (D)PREP SKN CHLRAPRP APPL 26ML -- CONVERT TO ITEM 371833

## (undated) DEVICE — SUTURE VCRL 1 L27IN ABSRB CT BRAID COAT UD J281H

## (undated) DEVICE — SUTURE VCRL SZ 2-0 L27IN ABSRB UD L36MM CP-1 1/2 CIR REV J266H

## (undated) DEVICE — ZIMMER® STERILE DISPOSABLE TOURNIQUET CUFF WITH PLC, DUAL PORT, SINGLE BLADDER, 34 IN. (86 CM)

## (undated) DEVICE — BLADE TNGE REG 6IN WOOD STRL -- CONVERT TO ITEM 153408

## (undated) DEVICE — STERILE POLYISOPRENE POWDER-FREE SURGICAL GLOVES WITH EMOLLIENT COATING: Brand: PROTEXIS

## (undated) DEVICE — 2T11 #2 PDO 36 X 36: Brand: 2T11 #2 PDO 36 X 36

## (undated) DEVICE — X-RAY SPONGES,16 PLY: Brand: DERMACEA

## (undated) DEVICE — SNARE POLYP SM AD W13MMXL240CM SHTH DIA2.4MM HEX STIFF

## (undated) DEVICE — SYR 10ML LUER LOK 1/5ML GRAD --

## (undated) DEVICE — CARTRIDGE BNE CEM MIX UNIV TWR VAC ROTOR BRK OFF NOZ W/O

## (undated) DEVICE — TUBING HYDR IRR --

## (undated) DEVICE — SCRUB DRY SURG EZ SCRUB BRUSH PREOPERATIVE GRN

## (undated) DEVICE — APPLICATOR BNDG 1MM ADH PREMIERPRO EXOFIN

## (undated) DEVICE — SNARE SURG 9 MM 2.4 MMX230 CM EXACTO CLD

## (undated) DEVICE — T5 HOOD WITH PEEL AWAY FACE SHIELD

## (undated) DEVICE — INFECTION CONTROL KIT SYS

## (undated) DEVICE — DRSG AQUACEL SURG 3.5 X 10IN -- CONVERT TO ITEM 370183

## (undated) DEVICE — TRAY CATH 16F URIN MTR LTX -- CONVERT TO ITEM 363111

## (undated) DEVICE — 4-PORT MANIFOLD: Brand: NEPTUNE 2

## (undated) DEVICE — 3M™ IOBAN™ 2 ANTIMICROBIAL INCISE DRAPE 6651EZ: Brand: IOBAN™ 2

## (undated) DEVICE — FRAZIER SUCTION INSTRUMENT 12 FR W/CONTROL VENT & OBTURATOR: Brand: FRAZIER

## (undated) DEVICE — SOLUTION IRRIG 3000ML 0.9% SOD CHL FLX CONT 0797208] ICU MEDICAL INC]

## (undated) DEVICE — HOOK LOCK LATEX FREE ELASTIC BANDAGE D/L 6INX10YD

## (undated) DEVICE — STERILE POLYISOPRENE POWDER-FREE SURGICAL GLOVES: Brand: PROTEXIS

## (undated) DEVICE — FORCEPS BX L240CM JAW DIA2.8MM L CAP W/ NDL MIC MESH TOOTH

## (undated) DEVICE — GAUZE SPONGES,12 PLY: Brand: CURITY

## (undated) DEVICE — REM POLYHESIVE ADULT PATIENT RETURN ELECTRODE: Brand: VALLEYLAB

## (undated) DEVICE — RECIPROCATING BLADE HEAVY DUTY LONG, OFFSET  (77.6 X 0.77 X 11.2MM)

## (undated) DEVICE — NEEDLE HYPO 21GA L1.5IN INTRAMUSCULAR S STL LATCH BVL UP

## (undated) DEVICE — HANDPIECE SET WITH BONE CLEANING TIP AND SUCTION TUBE: Brand: INTERPULSE

## (undated) DEVICE — CONTAINER,SPECIMEN,3OZ,OR STRL: Brand: MEDLINE

## (undated) DEVICE — DRAPE,EXTREMITY,89X128,STERILE: Brand: MEDLINE

## (undated) DEVICE — DEVON™ KNEE AND BODY STRAP 60" X 3" (1.5 M X 7.6 CM): Brand: DEVON

## (undated) DEVICE — SUTURE VCRL SZ 0 L27IN ABSRB UD L36MM CT-1 1/2 CIR J260H

## (undated) DEVICE — PREP SKN PREVAIL 40ML APPL --